# Patient Record
Sex: FEMALE | Race: WHITE | NOT HISPANIC OR LATINO
[De-identification: names, ages, dates, MRNs, and addresses within clinical notes are randomized per-mention and may not be internally consistent; named-entity substitution may affect disease eponyms.]

---

## 2017-12-27 ENCOUNTER — TRANSCRIPTION ENCOUNTER (OUTPATIENT)
Age: 37
End: 2017-12-27

## 2019-03-08 ENCOUNTER — RECORD ABSTRACTING (OUTPATIENT)
Age: 39
End: 2019-03-08

## 2019-03-08 DIAGNOSIS — K31.3 PYLOROSPASM, NOT ELSEWHERE CLASSIFIED: ICD-10-CM

## 2019-03-08 DIAGNOSIS — K29.70 GASTRITIS, UNSPECIFIED, W/OUT BLEEDING: ICD-10-CM

## 2019-03-08 DIAGNOSIS — Z82.5 FAMILY HISTORY OF ASTHMA AND OTHER CHRONIC LOWER RESPIRATORY DISEASES: ICD-10-CM

## 2019-03-08 DIAGNOSIS — Z87.42 PERSONAL HISTORY OF OTHER DISEASES OF THE FEMALE GENITAL TRACT: ICD-10-CM

## 2019-03-08 DIAGNOSIS — B96.81 HELICOBACTER PYLORI [H. PYLORI] AS THE CAUSE OF DISEASES CLASSIFIED ELSEWHERE: ICD-10-CM

## 2019-03-08 DIAGNOSIS — K64.8 OTHER HEMORRHOIDS: ICD-10-CM

## 2019-03-08 DIAGNOSIS — Z80.0 FAMILY HISTORY OF MALIGNANT NEOPLASM OF DIGESTIVE ORGANS: ICD-10-CM

## 2019-03-08 DIAGNOSIS — K58.2 MIXED IRRITABLE BOWEL SYNDROME: ICD-10-CM

## 2019-03-08 DIAGNOSIS — Z82.49 FAMILY HISTORY OF ISCHEMIC HEART DISEASE AND OTHER DISEASES OF THE CIRCULATORY SYSTEM: ICD-10-CM

## 2019-03-08 DIAGNOSIS — Z88.9 ALLERGY STATUS TO UNSPECIFIED DRUGS, MEDICAMENTS AND BIOLOGICAL SUBSTANCES: ICD-10-CM

## 2019-03-08 RX ORDER — CETIRIZINE HYDROCHLORIDE 10 MG/1
10 TABLET, FILM COATED ORAL DAILY
Refills: 0 | Status: ACTIVE | COMMUNITY

## 2019-05-22 DIAGNOSIS — K21.0 GASTRO-ESOPHAGEAL REFLUX DISEASE WITH ESOPHAGITIS: ICD-10-CM

## 2019-05-23 ENCOUNTER — APPOINTMENT (OUTPATIENT)
Dept: CARDIOLOGY | Facility: CLINIC | Age: 39
End: 2019-05-23
Payer: COMMERCIAL

## 2019-05-23 ENCOUNTER — NON-APPOINTMENT (OUTPATIENT)
Age: 39
End: 2019-05-23

## 2019-05-23 VITALS
BODY MASS INDEX: 42.88 KG/M2 | WEIGHT: 239 LBS | HEART RATE: 81 BPM | SYSTOLIC BLOOD PRESSURE: 106 MMHG | DIASTOLIC BLOOD PRESSURE: 82 MMHG | HEIGHT: 62.5 IN

## 2019-05-23 PROCEDURE — 99214 OFFICE O/P EST MOD 30 MIN: CPT | Mod: 25

## 2019-05-23 PROCEDURE — 93000 ELECTROCARDIOGRAM COMPLETE: CPT

## 2019-05-23 RX ORDER — OMEPRAZOLE MAGNESIUM 20 MG/1
20 TABLET, DELAYED RELEASE ORAL TWICE DAILY
Refills: 0 | Status: DISCONTINUED | COMMUNITY
End: 2019-05-23

## 2019-05-23 RX ORDER — LIRAGLUTIDE 6 MG/ML
18 INJECTION, SOLUTION SUBCUTANEOUS
Refills: 0 | Status: DISCONTINUED | COMMUNITY
End: 2019-05-23

## 2019-05-23 NOTE — HISTORY OF PRESENT ILLNESS
[FreeTextEntry1] : Ms stark has been followed here since 2017 for edema and water retention. She has not been hospitalized. She denies chest pain, dyspnea, palpitations or syncope. She is active in daily life but no formal exercise. Her left leg is numb from the thigh to the knee laterally.This occurred after a traumatic injury.   She gets dyspnea on exertion with inclines if she is rushing.

## 2020-02-19 ENCOUNTER — RESULT CHARGE (OUTPATIENT)
Age: 40
End: 2020-02-19

## 2020-02-20 ENCOUNTER — NON-APPOINTMENT (OUTPATIENT)
Age: 40
End: 2020-02-20

## 2020-02-20 ENCOUNTER — APPOINTMENT (OUTPATIENT)
Dept: CARDIOLOGY | Facility: CLINIC | Age: 40
End: 2020-02-20
Payer: COMMERCIAL

## 2020-02-20 VITALS
WEIGHT: 234.13 LBS | BODY MASS INDEX: 42.01 KG/M2 | HEIGHT: 62.5 IN | SYSTOLIC BLOOD PRESSURE: 110 MMHG | OXYGEN SATURATION: 98 % | HEART RATE: 82 BPM | DIASTOLIC BLOOD PRESSURE: 72 MMHG

## 2020-02-20 PROCEDURE — 99212 OFFICE O/P EST SF 10 MIN: CPT

## 2020-02-20 PROCEDURE — 93000 ELECTROCARDIOGRAM COMPLETE: CPT | Mod: NC

## 2020-02-20 NOTE — REVIEW OF SYSTEMS
[Fever] : no fever [Headache] : no headache [Feeling Fatigued] : not feeling fatigued [Chills] : no chills [Dyspnea on exertion] : not dyspnea during exertion [Shortness Of Breath] : no shortness of breath [Chest  Pressure] : no chest pressure [Chest Pain] : no chest pain [Palpitations] : no palpitations [Cough] : no cough [Skin: A Rash] : no rash: [Dizziness] : no dizziness [Confusion] : no confusion was observed

## 2020-02-20 NOTE — PHYSICAL EXAM
[General Appearance - Well Developed] : well developed [Well Groomed] : well groomed [Normal Appearance] : normal appearance [General Appearance - In No Acute Distress] : no acute distress [General Appearance - Well Nourished] : well nourished [Respiration, Rhythm And Depth] : normal respiratory rhythm and effort [Heart Rate And Rhythm] : heart rate and rhythm were normal [Auscultation Breath Sounds / Voice Sounds] : lungs were clear to auscultation bilaterally [Heart Sounds] : normal S1 and S2 [Edema] : no peripheral edema present [Cyanosis, Localized] : no localized cyanosis [Abnormal Walk] : normal gait [Abdomen Soft] : soft [Impaired Insight] : insight and judgment were intact [Oriented To Time, Place, And Person] : oriented to person, place, and time [] : no rash [Affect] : the affect was normal

## 2020-02-20 NOTE — REASON FOR VISIT
[FreeTextEntry1] : Ms. Banegas presents today for follow up visit. \par \par She reports doing well. She denies chest pain, SOB, palpitations, dizziness or syncope. She is undergoing work up for bariatric surgery.

## 2020-05-25 ENCOUNTER — TRANSCRIPTION ENCOUNTER (OUTPATIENT)
Age: 40
End: 2020-05-25

## 2020-05-28 ENCOUNTER — APPOINTMENT (OUTPATIENT)
Dept: CARDIOLOGY | Facility: CLINIC | Age: 40
End: 2020-05-28
Payer: COMMERCIAL

## 2020-05-28 PROCEDURE — 99213 OFFICE O/P EST LOW 20 MIN: CPT | Mod: 95

## 2020-05-28 NOTE — HISTORY OF PRESENT ILLNESS
[Home] : at home, [unfilled] , at the time of the visit. [Other Location: e.g. Home (Enter Location, City,State)___] : at [unfilled] [Verbal consent obtained from patient] : the patient, [unfilled] [FreeTextEntry1] : Ms stark has been followed here since 2017 for edema and water retention. She has not been hospitalized. She denies chest pain, dyspnea, palpitations or syncope. She is active in daily life but no formal exercise. Her left leg is numb from the thigh to the knee laterally.This occurred after a traumatic injury.   She gets dyspnea on exertion with inclines if she is rushing.\par Her bariatric surgery was cancelled for covid.She is on Optavia now.\par She has positive  covid antibodies, in March she had n illness that she though was just a bad period.

## 2020-10-26 ENCOUNTER — APPOINTMENT (OUTPATIENT)
Dept: HUMAN REPRODUCTION | Facility: CLINIC | Age: 40
End: 2020-10-26
Payer: COMMERCIAL

## 2020-10-26 PROCEDURE — 99204 OFFICE O/P NEW MOD 45 MIN: CPT | Mod: 95

## 2020-11-11 ENCOUNTER — APPOINTMENT (OUTPATIENT)
Dept: HUMAN REPRODUCTION | Facility: CLINIC | Age: 40
End: 2020-11-11
Payer: COMMERCIAL

## 2020-11-11 PROCEDURE — 36415 COLL VENOUS BLD VENIPUNCTURE: CPT

## 2020-11-11 PROCEDURE — 99072 ADDL SUPL MATRL&STAF TM PHE: CPT

## 2020-11-11 PROCEDURE — 99213 OFFICE O/P EST LOW 20 MIN: CPT | Mod: 25

## 2020-11-11 PROCEDURE — 76830 TRANSVAGINAL US NON-OB: CPT

## 2020-11-19 ENCOUNTER — APPOINTMENT (OUTPATIENT)
Dept: HUMAN REPRODUCTION | Facility: CLINIC | Age: 40
End: 2020-11-19
Payer: COMMERCIAL

## 2020-11-19 PROCEDURE — 99213 OFFICE O/P EST LOW 20 MIN: CPT | Mod: 95

## 2020-12-17 ENCOUNTER — APPOINTMENT (OUTPATIENT)
Dept: HUMAN REPRODUCTION | Facility: CLINIC | Age: 40
End: 2020-12-17
Payer: COMMERCIAL

## 2020-12-17 PROCEDURE — 99215 OFFICE O/P EST HI 40 MIN: CPT | Mod: 95

## 2021-01-15 ENCOUNTER — APPOINTMENT (OUTPATIENT)
Dept: HUMAN REPRODUCTION | Facility: CLINIC | Age: 41
End: 2021-01-15
Payer: COMMERCIAL

## 2021-01-15 PROCEDURE — 99213 OFFICE O/P EST LOW 20 MIN: CPT | Mod: 25

## 2021-01-15 PROCEDURE — 36415 COLL VENOUS BLD VENIPUNCTURE: CPT

## 2021-01-15 PROCEDURE — 99072 ADDL SUPL MATRL&STAF TM PHE: CPT

## 2021-01-15 PROCEDURE — 76830 TRANSVAGINAL US NON-OB: CPT

## 2021-01-21 ENCOUNTER — APPOINTMENT (OUTPATIENT)
Dept: OBGYN | Facility: CLINIC | Age: 41
End: 2021-01-21
Payer: COMMERCIAL

## 2021-01-21 VITALS
WEIGHT: 220 LBS | DIASTOLIC BLOOD PRESSURE: 80 MMHG | BODY MASS INDEX: 39.47 KG/M2 | SYSTOLIC BLOOD PRESSURE: 110 MMHG | HEIGHT: 62.5 IN

## 2021-01-21 DIAGNOSIS — Z01.419 ENCOUNTER FOR GYNECOLOGICAL EXAMINATION (GENERAL) (ROUTINE) W/OUT ABNORMAL FINDINGS: ICD-10-CM

## 2021-01-21 PROCEDURE — 99385 PREV VISIT NEW AGE 18-39: CPT

## 2021-01-21 PROCEDURE — 99072 ADDL SUPL MATRL&STAF TM PHE: CPT

## 2021-01-23 LAB
BACTERIA UR CULT: NORMAL
HPV HIGH+LOW RISK DNA PNL CVX: NOT DETECTED

## 2021-01-27 LAB
CANDIDA VAG CYTO: NOT DETECTED
CYTOLOGY CVX/VAG DOC THIN PREP: NORMAL
G VAGINALIS+PREV SP MTYP VAG QL MICRO: DETECTED
T VAGINALIS VAG QL WET PREP: NOT DETECTED

## 2021-02-12 ENCOUNTER — APPOINTMENT (OUTPATIENT)
Dept: HUMAN REPRODUCTION | Facility: CLINIC | Age: 41
End: 2021-02-12

## 2021-02-19 ENCOUNTER — APPOINTMENT (OUTPATIENT)
Dept: HUMAN REPRODUCTION | Facility: CLINIC | Age: 41
End: 2021-02-19
Payer: COMMERCIAL

## 2021-02-19 PROCEDURE — 58340 CATHETER FOR HYSTEROGRAPHY: CPT

## 2021-02-19 PROCEDURE — 99072 ADDL SUPL MATRL&STAF TM PHE: CPT

## 2021-02-19 PROCEDURE — 99213 OFFICE O/P EST LOW 20 MIN: CPT | Mod: 25

## 2021-02-19 PROCEDURE — 76831 ECHO EXAM UTERUS: CPT

## 2021-03-01 ENCOUNTER — APPOINTMENT (OUTPATIENT)
Dept: HUMAN REPRODUCTION | Facility: AMBULATORY SURGERY CENTER | Age: 41
End: 2021-03-01
Payer: COMMERCIAL

## 2021-03-01 PROCEDURE — 58561 HYSTEROSCOPY REMOVE MYOMA: CPT

## 2021-03-22 ENCOUNTER — APPOINTMENT (OUTPATIENT)
Dept: HUMAN REPRODUCTION | Facility: CLINIC | Age: 41
End: 2021-03-22
Payer: COMMERCIAL

## 2021-03-22 PROCEDURE — 99072 ADDL SUPL MATRL&STAF TM PHE: CPT

## 2021-03-22 PROCEDURE — 36415 COLL VENOUS BLD VENIPUNCTURE: CPT

## 2021-03-22 PROCEDURE — 76830 TRANSVAGINAL US NON-OB: CPT

## 2021-03-22 PROCEDURE — 99213 OFFICE O/P EST LOW 20 MIN: CPT | Mod: 25

## 2021-03-29 ENCOUNTER — APPOINTMENT (OUTPATIENT)
Dept: HUMAN REPRODUCTION | Facility: CLINIC | Age: 41
End: 2021-03-29
Payer: COMMERCIAL

## 2021-03-29 PROCEDURE — 58340 CATHETER FOR HYSTEROGRAPHY: CPT

## 2021-03-29 PROCEDURE — 99213 OFFICE O/P EST LOW 20 MIN: CPT | Mod: 25

## 2021-03-29 PROCEDURE — 76831 ECHO EXAM UTERUS: CPT

## 2021-03-29 PROCEDURE — 99072 ADDL SUPL MATRL&STAF TM PHE: CPT

## 2021-05-03 ENCOUNTER — APPOINTMENT (OUTPATIENT)
Dept: HUMAN REPRODUCTION | Facility: CLINIC | Age: 41
End: 2021-05-03
Payer: COMMERCIAL

## 2021-05-03 PROCEDURE — 99213 OFFICE O/P EST LOW 20 MIN: CPT

## 2021-05-03 PROCEDURE — 99072 ADDL SUPL MATRL&STAF TM PHE: CPT

## 2021-05-10 ENCOUNTER — APPOINTMENT (OUTPATIENT)
Dept: HUMAN REPRODUCTION | Facility: AMBULATORY SURGERY CENTER | Age: 41
End: 2021-05-10
Payer: COMMERCIAL

## 2021-05-10 PROCEDURE — 58561 HYSTEROSCOPY REMOVE MYOMA: CPT

## 2021-05-20 ENCOUNTER — APPOINTMENT (OUTPATIENT)
Dept: HUMAN REPRODUCTION | Facility: CLINIC | Age: 41
End: 2021-05-20
Payer: COMMERCIAL

## 2021-05-20 PROCEDURE — 99213 OFFICE O/P EST LOW 20 MIN: CPT | Mod: 95

## 2021-06-03 ENCOUNTER — APPOINTMENT (OUTPATIENT)
Dept: CARDIOLOGY | Facility: CLINIC | Age: 41
End: 2021-06-03
Payer: COMMERCIAL

## 2021-06-03 ENCOUNTER — NON-APPOINTMENT (OUTPATIENT)
Age: 41
End: 2021-06-03

## 2021-06-03 VITALS
DIASTOLIC BLOOD PRESSURE: 80 MMHG | HEIGHT: 62.5 IN | SYSTOLIC BLOOD PRESSURE: 115 MMHG | TEMPERATURE: 97.8 F | WEIGHT: 205 LBS | BODY MASS INDEX: 36.78 KG/M2 | HEART RATE: 75 BPM

## 2021-06-03 DIAGNOSIS — Z00.00 ENCOUNTER FOR GENERAL ADULT MEDICAL EXAMINATION W/OUT ABNORMAL FINDINGS: ICD-10-CM

## 2021-06-03 DIAGNOSIS — Z82.49 FAMILY HISTORY OF ISCHEMIC HEART DISEASE AND OTHER DISEASES OF THE CIRCULATORY SYSTEM: ICD-10-CM

## 2021-06-03 PROCEDURE — 36415 COLL VENOUS BLD VENIPUNCTURE: CPT

## 2021-06-03 PROCEDURE — 99072 ADDL SUPL MATRL&STAF TM PHE: CPT

## 2021-06-03 PROCEDURE — 93000 ELECTROCARDIOGRAM COMPLETE: CPT

## 2021-06-03 PROCEDURE — 99214 OFFICE O/P EST MOD 30 MIN: CPT

## 2021-06-03 RX ORDER — METRONIDAZOLE 7.5 MG/G
0.75 GEL VAGINAL
Qty: 1 | Refills: 0 | Status: DISCONTINUED | COMMUNITY
Start: 2021-01-27 | End: 2021-06-03

## 2021-06-03 NOTE — CARDIOLOGY SUMMARY
[___] : [unfilled] [LVEF ___%] : LVEF [unfilled]% [de-identified] : 6/3/2021- bonilla cheng [de-identified] : 2017- ef 65%

## 2021-06-04 LAB
ANION GAP SERPL CALC-SCNC: 10 MMOL/L
BUN SERPL-MCNC: 16 MG/DL
CALCIUM SERPL-MCNC: 9.1 MG/DL
CHLORIDE SERPL-SCNC: 104 MMOL/L
CHOLEST SERPL-MCNC: 166 MG/DL
CO2 SERPL-SCNC: 25 MMOL/L
CREAT SERPL-MCNC: 0.78 MG/DL
GLUCOSE SERPL-MCNC: 112 MG/DL
HDLC SERPL-MCNC: 48 MG/DL
LDLC SERPL CALC-MCNC: 87 MG/DL
NONHDLC SERPL-MCNC: 117 MG/DL
POTASSIUM SERPL-SCNC: 4.5 MMOL/L
SODIUM SERPL-SCNC: 138 MMOL/L
TRIGL SERPL-MCNC: 150 MG/DL

## 2021-06-11 ENCOUNTER — APPOINTMENT (OUTPATIENT)
Dept: HUMAN REPRODUCTION | Facility: CLINIC | Age: 41
End: 2021-06-11
Payer: COMMERCIAL

## 2021-06-11 PROCEDURE — 76831 ECHO EXAM UTERUS: CPT

## 2021-06-11 PROCEDURE — 99072 ADDL SUPL MATRL&STAF TM PHE: CPT

## 2021-06-11 PROCEDURE — 99213 OFFICE O/P EST LOW 20 MIN: CPT | Mod: 25

## 2021-06-11 PROCEDURE — 58340 CATHETER FOR HYSTEROGRAPHY: CPT

## 2021-10-13 ENCOUNTER — RESULT REVIEW (OUTPATIENT)
Age: 41
End: 2021-10-13

## 2022-02-18 ENCOUNTER — APPOINTMENT (OUTPATIENT)
Dept: HUMAN REPRODUCTION | Facility: CLINIC | Age: 42
End: 2022-02-18
Payer: COMMERCIAL

## 2022-02-18 PROCEDURE — 36415 COLL VENOUS BLD VENIPUNCTURE: CPT

## 2022-02-18 PROCEDURE — 76830 TRANSVAGINAL US NON-OB: CPT

## 2022-02-18 PROCEDURE — 99213 OFFICE O/P EST LOW 20 MIN: CPT | Mod: 25

## 2022-02-24 ENCOUNTER — APPOINTMENT (OUTPATIENT)
Dept: HUMAN REPRODUCTION | Facility: CLINIC | Age: 42
End: 2022-02-24
Payer: COMMERCIAL

## 2022-02-24 PROCEDURE — 36415 COLL VENOUS BLD VENIPUNCTURE: CPT

## 2022-02-24 PROCEDURE — 76830 TRANSVAGINAL US NON-OB: CPT

## 2022-02-28 ENCOUNTER — APPOINTMENT (OUTPATIENT)
Dept: HUMAN REPRODUCTION | Facility: CLINIC | Age: 42
End: 2022-02-28
Payer: COMMERCIAL

## 2022-02-28 PROCEDURE — 76856 US EXAM PELVIC COMPLETE: CPT

## 2022-02-28 PROCEDURE — 99213 OFFICE O/P EST LOW 20 MIN: CPT | Mod: 25

## 2022-02-28 PROCEDURE — 36415 COLL VENOUS BLD VENIPUNCTURE: CPT

## 2022-03-03 ENCOUNTER — APPOINTMENT (OUTPATIENT)
Dept: HUMAN REPRODUCTION | Facility: CLINIC | Age: 42
End: 2022-03-03
Payer: COMMERCIAL

## 2022-03-03 PROCEDURE — 36415 COLL VENOUS BLD VENIPUNCTURE: CPT

## 2022-03-03 PROCEDURE — 76856 US EXAM PELVIC COMPLETE: CPT

## 2022-03-25 ENCOUNTER — APPOINTMENT (OUTPATIENT)
Dept: HUMAN REPRODUCTION | Facility: CLINIC | Age: 42
End: 2022-03-25
Payer: COMMERCIAL

## 2022-03-25 PROCEDURE — 99213 OFFICE O/P EST LOW 20 MIN: CPT | Mod: 25

## 2022-03-25 PROCEDURE — 36415 COLL VENOUS BLD VENIPUNCTURE: CPT

## 2022-03-25 PROCEDURE — 76830 TRANSVAGINAL US NON-OB: CPT

## 2022-04-01 ENCOUNTER — APPOINTMENT (OUTPATIENT)
Dept: HUMAN REPRODUCTION | Facility: CLINIC | Age: 42
End: 2022-04-01
Payer: COMMERCIAL

## 2022-04-01 PROCEDURE — 76830 TRANSVAGINAL US NON-OB: CPT

## 2022-04-01 PROCEDURE — 99213 OFFICE O/P EST LOW 20 MIN: CPT | Mod: 25

## 2022-04-01 PROCEDURE — 36415 COLL VENOUS BLD VENIPUNCTURE: CPT

## 2022-04-08 ENCOUNTER — APPOINTMENT (OUTPATIENT)
Dept: HUMAN REPRODUCTION | Facility: CLINIC | Age: 42
End: 2022-04-08
Payer: COMMERCIAL

## 2022-04-08 PROCEDURE — 76830 TRANSVAGINAL US NON-OB: CPT

## 2022-04-08 PROCEDURE — 99213 OFFICE O/P EST LOW 20 MIN: CPT | Mod: 25

## 2022-04-08 PROCEDURE — 36415 COLL VENOUS BLD VENIPUNCTURE: CPT

## 2022-04-13 ENCOUNTER — APPOINTMENT (OUTPATIENT)
Dept: HUMAN REPRODUCTION | Facility: CLINIC | Age: 42
End: 2022-04-13
Payer: COMMERCIAL

## 2022-04-13 PROCEDURE — 76830 TRANSVAGINAL US NON-OB: CPT

## 2022-04-13 PROCEDURE — 36415 COLL VENOUS BLD VENIPUNCTURE: CPT

## 2022-04-28 ENCOUNTER — APPOINTMENT (OUTPATIENT)
Dept: HUMAN REPRODUCTION | Facility: CLINIC | Age: 42
End: 2022-04-28
Payer: COMMERCIAL

## 2022-04-28 PROCEDURE — 36415 COLL VENOUS BLD VENIPUNCTURE: CPT

## 2022-05-09 ENCOUNTER — APPOINTMENT (OUTPATIENT)
Dept: HUMAN REPRODUCTION | Facility: CLINIC | Age: 42
End: 2022-05-09
Payer: COMMERCIAL

## 2022-05-09 PROCEDURE — 76830 TRANSVAGINAL US NON-OB: CPT

## 2022-05-09 PROCEDURE — 36415 COLL VENOUS BLD VENIPUNCTURE: CPT

## 2022-05-16 ENCOUNTER — APPOINTMENT (OUTPATIENT)
Dept: HUMAN REPRODUCTION | Facility: CLINIC | Age: 42
End: 2022-05-16
Payer: COMMERCIAL

## 2022-05-16 PROCEDURE — 99213 OFFICE O/P EST LOW 20 MIN: CPT | Mod: 25

## 2022-05-16 PROCEDURE — 76830 TRANSVAGINAL US NON-OB: CPT

## 2022-05-16 PROCEDURE — 36415 COLL VENOUS BLD VENIPUNCTURE: CPT

## 2022-05-19 ENCOUNTER — APPOINTMENT (OUTPATIENT)
Dept: HUMAN REPRODUCTION | Facility: CLINIC | Age: 42
End: 2022-05-19
Payer: COMMERCIAL

## 2022-05-19 PROCEDURE — 76830 TRANSVAGINAL US NON-OB: CPT

## 2022-05-19 PROCEDURE — 36415 COLL VENOUS BLD VENIPUNCTURE: CPT

## 2022-05-23 ENCOUNTER — APPOINTMENT (OUTPATIENT)
Dept: HUMAN REPRODUCTION | Facility: CLINIC | Age: 42
End: 2022-05-23
Payer: COMMERCIAL

## 2022-05-23 PROCEDURE — 76830 TRANSVAGINAL US NON-OB: CPT

## 2022-05-23 PROCEDURE — 36415 COLL VENOUS BLD VENIPUNCTURE: CPT

## 2022-05-23 PROCEDURE — 99213 OFFICE O/P EST LOW 20 MIN: CPT | Mod: 25

## 2022-05-25 ENCOUNTER — APPOINTMENT (OUTPATIENT)
Dept: HUMAN REPRODUCTION | Facility: CLINIC | Age: 42
End: 2022-05-25
Payer: COMMERCIAL

## 2022-05-25 PROCEDURE — 36415 COLL VENOUS BLD VENIPUNCTURE: CPT

## 2022-05-31 ENCOUNTER — APPOINTMENT (OUTPATIENT)
Dept: HUMAN REPRODUCTION | Facility: CLINIC | Age: 42
End: 2022-05-31
Payer: COMMERCIAL

## 2022-05-31 PROCEDURE — 36415 COLL VENOUS BLD VENIPUNCTURE: CPT

## 2022-06-05 ENCOUNTER — NON-APPOINTMENT (OUTPATIENT)
Age: 42
End: 2022-06-05

## 2022-06-06 ENCOUNTER — NON-APPOINTMENT (OUTPATIENT)
Age: 42
End: 2022-06-06

## 2022-06-06 ENCOUNTER — APPOINTMENT (OUTPATIENT)
Dept: CARDIOLOGY | Facility: CLINIC | Age: 42
End: 2022-06-06
Payer: COMMERCIAL

## 2022-06-06 VITALS
WEIGHT: 242 LBS | DIASTOLIC BLOOD PRESSURE: 80 MMHG | HEIGHT: 62 IN | BODY MASS INDEX: 44.53 KG/M2 | SYSTOLIC BLOOD PRESSURE: 117 MMHG

## 2022-06-06 PROCEDURE — 93000 ELECTROCARDIOGRAM COMPLETE: CPT

## 2022-06-06 PROCEDURE — 99214 OFFICE O/P EST MOD 30 MIN: CPT

## 2022-06-06 RX ORDER — OMEPRAZOLE 20 MG/1
20 CAPSULE, DELAYED RELEASE ORAL
Refills: 0 | Status: DISCONTINUED | COMMUNITY
End: 2022-06-06

## 2022-06-06 NOTE — HISTORY OF PRESENT ILLNESS
[FreeTextEntry1] : Ms stark has been followed here since 2017 for edema and water retention.\par  Since last visit She has not been hospitalized. She had positive covid antibodies ( was ill march 2020).\par \par She There have been no hospitalizations since last visit.\par \par \par She has been working with Dr Julissa Monson for infertility, had a hysteroscopy and was treated with hormones. \par \par She denies chest pain, she has dyspnea on exertion, palpitations or syncope. She gets a rare sensation in her left chest like " a tingle".\par \par She is active in daily life but no formal exercise.  \par

## 2022-06-07 ENCOUNTER — APPOINTMENT (OUTPATIENT)
Dept: ENDOCRINOLOGY | Facility: CLINIC | Age: 42
End: 2022-06-07
Payer: COMMERCIAL

## 2022-06-07 DIAGNOSIS — Z83.3 FAMILY HISTORY OF DIABETES MELLITUS: ICD-10-CM

## 2022-06-07 DIAGNOSIS — Z83.49 FAMILY HISTORY OF OTHER ENDOCRINE, NUTRITIONAL AND METABOLIC DISEASES: ICD-10-CM

## 2022-06-07 DIAGNOSIS — Z82.49 FAMILY HISTORY OF ISCHEMIC HEART DISEASE AND OTHER DISEASES OF THE CIRCULATORY SYSTEM: ICD-10-CM

## 2022-06-07 PROCEDURE — 99205 OFFICE O/P NEW HI 60 MIN: CPT | Mod: 25,95

## 2022-06-07 PROCEDURE — G0447 BEHAVIOR COUNSEL OBESITY 15M: CPT | Mod: 95

## 2022-06-07 RX ORDER — ADHESIVE TAPE 3"X 2.3 YD
50 MCG TAPE, NON-MEDICATED TOPICAL
Refills: 0 | Status: ACTIVE | COMMUNITY

## 2022-06-07 NOTE — REVIEW OF SYSTEMS
[Recent Weight Gain (___ Lbs)] : recent weight gain: [unfilled] lbs [Lower Ext Edema] : lower extremity edema [Constipation] : constipation [Irregular Menses] : irregular menses [Pain/Numbness of Digits] : pain/numbness of digits [FreeTextEntry7] : some times depends on eating  [FreeTextEntry8] : gets it monthly  [de-identified] : loss of body hair

## 2022-06-07 NOTE — HISTORY OF PRESENT ILLNESS
[Home] : at home, [unfilled] , at the time of the visit. [Medical Office: ([facility])___] : at the medical office located in  [Verbal consent obtained from patient] : the patient, [unfilled] [FreeTextEntry1] : Ms. SOUMYA HICKS is a 41 year old female sent in a consult by Dr Rodriguez for  h/o prolactinoma was on Parlodel for many year , with breast discharge at diagnosis , months with no periods \par Dr Julissa Adhikari is her fertility doctor , trying to conceive \par \par was told she has PCOS, irregular periods\par pituiatry adenoma 14yo ,  was initially on medication, then was stopped then 2yrs later growing in density per pt, was advised surgery , Dr [facility], removed 2004, when she was 25yo, small left over per pt, last scan 12 yrs ago with Dr Hutchinson , none since then\par \par retaining water, gained 40 lb  since end of last year\par \par was told she has only left ovary working , her  was checked and all good with him per pt \par was told she has low ovarian reserve by Dr Adhikari\par 2020 labs Dr Adhikari \par \par has Mediterranean anemia \par A1c 5.6\par ferritin 13.4\par \par started period at 9yo \par \par OCP since 14yo up to 32yo \par \par \par was told prediabetes

## 2022-06-07 NOTE — CONSULT LETTER
[Dear  ___] : Dear  [unfilled], [Consult Letter:] : I had the pleasure of evaluating your patient, [unfilled]. [Please see my note below.] : Please see my note below. [Sincerely,] : Sincerely, [FreeTextEntry3] : Sincerely,\par \par RIYA BRADFORD MD\par [facility]\par [facility]\par

## 2022-06-17 ENCOUNTER — LABORATORY RESULT (OUTPATIENT)
Age: 42
End: 2022-06-17

## 2022-06-22 ENCOUNTER — APPOINTMENT (OUTPATIENT)
Dept: ENDOCRINOLOGY | Facility: CLINIC | Age: 42
End: 2022-06-22

## 2022-06-22 VITALS — BODY MASS INDEX: 44.53 KG/M2 | HEIGHT: 62 IN | WEIGHT: 242 LBS

## 2022-06-22 LAB
17OHP SERPL-MCNC: 18 NG/DL
ACTH SER-ACNC: 20.8 PG/ML
ALBUMIN SERPL ELPH-MCNC: 4.4 G/DL
ALP BLD-CCNC: 100 U/L
ALPHA SUBUNIT SERPL-MCNC: 0.22 NG/ML
ALT SERPL-CCNC: 37 U/L
ANDROST SERPL-MCNC: 80 NG/DL
ANION GAP SERPL CALC-SCNC: 13 MMOL/L
AST SERPL-CCNC: 20 U/L
BASOPHILS # BLD AUTO: 0.03 K/UL
BASOPHILS NFR BLD AUTO: 0.5 %
BILIRUB SERPL-MCNC: 0.4 MG/DL
BUN SERPL-MCNC: 10 MG/DL
CALCIUM SERPL-MCNC: 9.4 MG/DL
CHLORIDE SERPL-SCNC: 103 MMOL/L
CHOLEST SERPL-MCNC: 173 MG/DL
CO2 SERPL-SCNC: 24 MMOL/L
CORTIS SERPL-MCNC: 12 UG/DL
CORTIS SERPL-MCNC: 12.1 UG/DL
CREAT SERPL-MCNC: 0.68 MG/DL
DEXAMETHASONE LEVEL: <20 NG/DL
DHEA-S SERPL-MCNC: 274 UG/DL
EGFR: 113 ML/MIN/1.73M2
ENDOMYSIUM IGA SER QL: NEGATIVE
ENDOMYSIUM IGA TITR SER: NORMAL
EOSINOPHIL # BLD AUTO: 0.03 K/UL
EOSINOPHIL NFR BLD AUTO: 0.5 %
ESTIMATED AVERAGE GLUCOSE: 114 MG/DL
ESTRADIOL SERPL-MCNC: 20 PG/ML
FERRITIN SERPL-MCNC: 33 NG/ML
FSH SERPL-MCNC: 11.8 IU/L
GH SERPL-MCNC: 0.28 NG/ML
GLIADIN IGA SER QL: <5 UNITS
GLIADIN IGG SER QL: <5 UNITS
GLIADIN PEPTIDE IGA SER-ACNC: NEGATIVE
GLIADIN PEPTIDE IGG SER-ACNC: NEGATIVE
GLUCOSE 1H P 100 G GLC PO SERPL-MCNC: 223 MG/DL
GLUCOSE 2H P CHAL SERPL-MCNC: 156 MG/DL
GLUCOSE 3H P CHAL SERPL-MCNC: 72 MG/DL
GLUCOSE BS SERPL-MCNC: 96 MG/DL
GLUCOSE SERPL-MCNC: 106 MG/DL
HBA1C MFR BLD HPLC: 5.6 %
HCT VFR BLD CALC: 42.2 %
HDLC SERPL-MCNC: 43 MG/DL
HGB BLD-MCNC: 13.7 G/DL
IGA SER QL IEP: 187 MG/DL
IGF-1 INTERP: NORMAL
IGF-I BLD-MCNC: 116 NG/ML
IMM GRANULOCYTES NFR BLD AUTO: 0.5 %
INSULIN 1H P CHAL SERPL-SCNC: 199 UU/ML
INSULIN 2H P CHAL SERPL-ACNC: 435 UU/ML
INSULIN P FAST SERPL-ACNC: 22.4 UU/ML
INSULIN SERPL-MCNC: 112 UU/ML
IRON SATN MFR SERPL: 27 %
IRON SERPL-MCNC: 85 UG/DL
LDLC SERPL CALC-MCNC: 100 MG/DL
LH SERPL-ACNC: 7.6 IU/L
LYMPHOCYTES # BLD AUTO: 1.84 K/UL
LYMPHOCYTES NFR BLD AUTO: 30.5 %
MAN DIFF?: NORMAL
MCHC RBC-ENTMCNC: 29.3 PG
MCHC RBC-ENTMCNC: 32.5 GM/DL
MCV RBC AUTO: 90.4 FL
MONOCYTES # BLD AUTO: 0.3 K/UL
MONOCYTES NFR BLD AUTO: 5 %
MONOMERIC PROLACTIN (ICMA)*: 16.1 NG/ML
NEUTROPHILS # BLD AUTO: 3.8 K/UL
NEUTROPHILS NFR BLD AUTO: 63 %
NONHDLC SERPL-MCNC: 130 MG/DL
OSMOLALITY SERPL: 291 MOSMOL/KG
OVARY AB SER QL IF: NEGATIVE
PERCENT MACROPROLACTIN: 16 %
PLATELET # BLD AUTO: 353 K/UL
POTASSIUM SERPL-SCNC: 4.3 MMOL/L
PROLACTIN SERPL-MCNC: 18.8 NG/ML
PROLACTIN SERPL-MCNC: 18.8 NG/ML
PROLACTIN SERPL-MCNC: 20.1 NG/ML
PROLACTIN, SERUM (ICMA)*: 19.2 NG/ML
PROT SERPL-MCNC: 6.7 G/DL
RBC # BLD: 4.67 M/UL
RBC # FLD: 13 %
SODIUM SERPL-SCNC: 141 MMOL/L
T4 FREE SERPL-MCNC: 1 NG/DL
TESTOST SERPL-MCNC: 26.5 NG/DL
TIBC SERPL-MCNC: 320 UG/DL
TRIGL SERPL-MCNC: 149 MG/DL
TSH SERPL-ACNC: 2.22 UIU/ML
TTG IGA SER IA-ACNC: 2.2 U/ML
TTG IGA SER-ACNC: NEGATIVE
TTG IGG SER IA-ACNC: <1.2 U/ML
TTG IGG SER IA-ACNC: NEGATIVE
UIBC SERPL-MCNC: 236 UG/DL
WBC # FLD AUTO: 6.03 K/UL

## 2022-06-22 PROCEDURE — 99215 OFFICE O/P EST HI 40 MIN: CPT | Mod: 95

## 2022-06-22 PROCEDURE — G0447 BEHAVIOR COUNSEL OBESITY 15M: CPT | Mod: 59,95

## 2022-06-22 RX ORDER — DEXAMETHASONE 1 MG/1
1 TABLET ORAL
Qty: 1 | Refills: 0 | Status: DISCONTINUED | COMMUNITY
Start: 2022-06-07 | End: 2022-06-22

## 2022-06-22 NOTE — HISTORY OF PRESENT ILLNESS
[Home] : at home, [unfilled] , at the time of the visit. [Medical Office: ([facility])___] : at the medical office located in  [Verbal consent obtained from patient] : the patient, [unfilled] [FreeTextEntry1] : Ms. SOUMYA HICKS is a 42 year old female initially sent in a consult by Dr Rodriguez for  h/o prolactinoma was on Parlodel for many year , with breast discharge at diagnosis , months with no periods \par Dr Julissa Monson is her fertility doctor , trying to conceive , planning IUI per pt \par \par was told she has PCOS, with  irregular periods by prior Endo \par pituiatry adenoma 16yo ,  was initially on medication, then was stopped then 2yrs later growing in density per pt, was advised surgery , Dr Hutchinson Neuro[facility], removed 2004, when she was 25yo, small left over per pt, last scan 12 yrs ago with Dr Hutchinson , none since then\par \par retaining water, gained 40 lb  since end of last year\par \par was told she has only left ovary working , her  was checked and all good with him per pt \par was told she has low ovarian reserve by Dr Monson, has low normal estradiol with our labs third day of her period \par 2020 labs Dr Monson estradiol 37, with us 20\par \par has Mediterranean anemia \par A1c 5.6\par ferritin 13.4\par \par started period at 11yo \par \par OCP since 16yo up to 30yo \par \par \par was told prediabetes

## 2022-06-22 NOTE — REASON FOR VISIT
[Follow - Up] : a follow-up visit [Weight Management/Obesity] : weight management/obesity [PCOS] : PCOS [Other___] : [unfilled]

## 2022-06-22 NOTE — REVIEW OF SYSTEMS
[Recent Weight Gain (___ Lbs)] : recent weight gain: [unfilled] lbs [Lower Ext Edema] : lower extremity edema [Constipation] : constipation [Irregular Menses] : irregular menses [Pain/Numbness of Digits] : pain/numbness of digits [FreeTextEntry7] : some times depends on eating  [FreeTextEntry8] : gets it monthly  [de-identified] : loss of body hair

## 2022-07-05 ENCOUNTER — NON-APPOINTMENT (OUTPATIENT)
Age: 42
End: 2022-07-05

## 2022-07-15 ENCOUNTER — APPOINTMENT (OUTPATIENT)
Dept: HUMAN REPRODUCTION | Facility: CLINIC | Age: 42
End: 2022-07-15

## 2022-07-15 PROCEDURE — 36415 COLL VENOUS BLD VENIPUNCTURE: CPT

## 2022-07-15 PROCEDURE — 76830 TRANSVAGINAL US NON-OB: CPT

## 2022-07-15 PROCEDURE — 99213 OFFICE O/P EST LOW 20 MIN: CPT | Mod: 25

## 2022-07-25 ENCOUNTER — APPOINTMENT (OUTPATIENT)
Dept: HUMAN REPRODUCTION | Facility: CLINIC | Age: 42
End: 2022-07-25

## 2022-07-25 PROCEDURE — 36415 COLL VENOUS BLD VENIPUNCTURE: CPT

## 2022-07-25 PROCEDURE — 99213 OFFICE O/P EST LOW 20 MIN: CPT | Mod: 25

## 2022-07-25 PROCEDURE — 76830 TRANSVAGINAL US NON-OB: CPT

## 2022-07-26 ENCOUNTER — RESULT REVIEW (OUTPATIENT)
Age: 42
End: 2022-07-26

## 2022-07-27 ENCOUNTER — TRANSCRIPTION ENCOUNTER (OUTPATIENT)
Age: 42
End: 2022-07-27

## 2022-07-27 ENCOUNTER — APPOINTMENT (OUTPATIENT)
Dept: CARDIOLOGY | Facility: CLINIC | Age: 42
End: 2022-07-27

## 2022-07-29 ENCOUNTER — NON-APPOINTMENT (OUTPATIENT)
Age: 42
End: 2022-07-29

## 2022-09-26 ENCOUNTER — APPOINTMENT (OUTPATIENT)
Dept: HUMAN REPRODUCTION | Facility: CLINIC | Age: 42
End: 2022-09-26

## 2022-09-26 PROCEDURE — 76830 TRANSVAGINAL US NON-OB: CPT

## 2022-09-26 PROCEDURE — 36415 COLL VENOUS BLD VENIPUNCTURE: CPT

## 2022-09-30 ENCOUNTER — APPOINTMENT (OUTPATIENT)
Dept: HUMAN REPRODUCTION | Facility: CLINIC | Age: 42
End: 2022-09-30

## 2022-09-30 PROCEDURE — 76830 TRANSVAGINAL US NON-OB: CPT

## 2022-09-30 PROCEDURE — 36415 COLL VENOUS BLD VENIPUNCTURE: CPT

## 2022-09-30 PROCEDURE — 99213 OFFICE O/P EST LOW 20 MIN: CPT | Mod: 25

## 2022-10-03 ENCOUNTER — APPOINTMENT (OUTPATIENT)
Dept: HUMAN REPRODUCTION | Facility: CLINIC | Age: 42
End: 2022-10-03

## 2022-10-03 PROCEDURE — 76830 TRANSVAGINAL US NON-OB: CPT

## 2022-10-03 PROCEDURE — 99213 OFFICE O/P EST LOW 20 MIN: CPT | Mod: 25

## 2022-10-03 PROCEDURE — 36415 COLL VENOUS BLD VENIPUNCTURE: CPT

## 2022-10-05 ENCOUNTER — APPOINTMENT (OUTPATIENT)
Dept: HUMAN REPRODUCTION | Facility: CLINIC | Age: 42
End: 2022-10-05

## 2022-10-05 PROCEDURE — 58322 ARTIFICIAL INSEMINATION: CPT

## 2022-10-05 PROCEDURE — 89261 SPERM ISOLATION COMPLEX: CPT

## 2022-10-05 PROCEDURE — 99213 OFFICE O/P EST LOW 20 MIN: CPT | Mod: 25

## 2022-10-10 ENCOUNTER — APPOINTMENT (OUTPATIENT)
Dept: HUMAN REPRODUCTION | Facility: CLINIC | Age: 42
End: 2022-10-10

## 2022-10-17 NOTE — HISTORY OF PRESENT ILLNESS
Spoke to patient and discussed the patch testing process. Application scheduled  at 12pm. Reading scheduled 11/15 at 4pm with Dr. Mayfield. Patient is aware both appts will be at the [place] office.     Patch Testing Questionnaire        1. Approximately when did the rash start?  2 years    2. Main areas of body affected by rash:    Hands     3. How severe is your rash at its worst? Moderate     4. Have you had patch testing in the past?   []Yes     [x]No  If Yes, details:      5. Do you have a history of the following?    Eczema      6. 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 Do you think your rash is triggered by your occupation?  []Yes    [x]No    8. Do you have anything implanted in your body?  (eg, dental implants, metal screws/clips, joint replacement, pacemaker)    []Yes    [x]No    9. Are there specific things you are exposed to that you know trigger the rash?  What are they? none      Please complete this form and fax it to [phone]  You may contact Dr. Mayfield's office with questions regarding patch [phone]   [FreeTextEntry1] : Ms stark has been followed here since 2017 for edema and water retention.\par  Since last visit She has not been hospitalized. She had positive covid antibodies ( was ill march 2020).\par \par She has been working with Dr Julissa Monson for infertility, had a hysteroscopy and was treated with hormones. \par \par  She denies chest pain, dyspnea, palpitations or syncope. \par She is active in daily life but no formal exercise.  \par She is getting  in November.

## 2022-10-19 ENCOUNTER — APPOINTMENT (OUTPATIENT)
Dept: HUMAN REPRODUCTION | Facility: CLINIC | Age: 42
End: 2022-10-19

## 2022-10-19 PROCEDURE — 36415 COLL VENOUS BLD VENIPUNCTURE: CPT

## 2022-10-25 ENCOUNTER — APPOINTMENT (OUTPATIENT)
Dept: ENDOCRINOLOGY | Facility: CLINIC | Age: 42
End: 2022-10-25

## 2022-10-25 VITALS
HEIGHT: 62 IN | OXYGEN SATURATION: 99 % | DIASTOLIC BLOOD PRESSURE: 82 MMHG | WEIGHT: 243 LBS | SYSTOLIC BLOOD PRESSURE: 122 MMHG | HEART RATE: 107 BPM | BODY MASS INDEX: 44.72 KG/M2

## 2022-10-25 PROCEDURE — G0447 BEHAVIOR COUNSEL OBESITY 15M: CPT

## 2022-10-25 PROCEDURE — 99215 OFFICE O/P EST HI 40 MIN: CPT | Mod: 25

## 2022-10-25 NOTE — REVIEW OF SYSTEMS
[Recent Weight Gain (___ Lbs)] : recent weight gain: [unfilled] lbs [Lower Ext Edema] : lower extremity edema [Constipation] : constipation [Irregular Menses] : irregular menses [Pain/Numbness of Digits] : pain/numbness of digits [FreeTextEntry6] : Her  complaining of her snoring at night has also sinus problems per patient never had a sleep study [FreeTextEntry7] : some times depends on eating  [FreeTextEntry8] : gets it monthly  [de-identified] : loss of body hair

## 2022-10-25 NOTE — HISTORY OF PRESENT ILLNESS
[Home] : at home, [unfilled] , at the time of the visit. [Medical Office: ([facility])___] : at the medical office located in  [Verbal consent obtained from patient] : the patient, [unfilled] [FreeTextEntry1] : Ms. SOUMYA HICKS is a 42 year old female initially sent in a consult by Dr Rodriguez for  h/o prolactinoma was on Parlodel for many year , with breast discharge at diagnosis , months with no periods \par Dr Julissa Monson is her fertility doctor , trying to conceive , patient had IUI unsuccessful x1 not willing to do IVF due to the cost\par Gained 40 pounds after she got  was able to lose 40 pounds before her wedding afterwards she slowed down the exercise amount\par \par was told she has PCOS, with  irregular periods by prior Endo \par pituiatry adenoma 14yo ,  was initially on medication, then was stopped then 2yrs later growing in density per pt, was advised surgery , Dr [facility], removed 2004, when she was 23yo, small left over per pt, last scan 12 yrs ago with Dr Hutchinson , none since then\par \par retaining water, gained 40 lb  since end of last year\par \par was told she has only left ovary working , her  was checked and all good with him per pt \par was told she has low ovarian reserve by Dr Monson, has low normal estradiol with our labs third day of her period \par 2020 labs Dr Monson estradiol 37, with us 20\par \par has Mediterranean anemia \par A1c 5.6\par ferritin 13.4\par \par started period at 9yo \par \par OCP since 14yo up to 32yo \par \par \par was told prediabetes none

## 2022-10-25 NOTE — CONSULT LETTER
[Please see my note below.] : Please see my note below. [FreeTextEntry3] : Sincerely,\par \par RIYA BRADFORD MD\par [facility]\par [facility]\par

## 2022-10-25 NOTE — PHYSICAL EXAM
[Alert] : alert [Well Nourished] : well nourished [Obese] : obese [No Acute Distress] : no acute distress [Well Developed] : well developed [Normal Sclera/Conjunctiva] : normal sclera/conjunctiva [EOMI] : extra ocular movement intact [No Proptosis] : no proptosis [Normal Oropharynx] : the oropharynx was normal [No Respiratory Distress] : no respiratory distress [No Accessory Muscle Use] : no accessory muscle use [Clear to Auscultation] : lungs were clear to auscultation bilaterally [Normal S1, S2] : normal S1 and S2 [Normal Rate] : heart rate was normal [Regular Rhythm] : with a regular rhythm [No Edema] : no peripheral edema [Pedal Pulses Normal] : the pedal pulses are present [Normal Bowel Sounds] : normal bowel sounds [Not Tender] : non-tender [Not Distended] : not distended [Soft] : abdomen soft [Normal Anterior Cervical Nodes] : no anterior cervical lymphadenopathy [Normal Posterior Cervical Nodes] : no posterior cervical lymphadenopathy [No Spinal Tenderness] : no spinal tenderness [Spine Straight] : spine straight [No Stigmata of Cushings Syndrome] : no stigmata of Cushings Syndrome [Normal Gait] : normal gait [Normal Strength/Tone] : muscle strength and tone were normal [No Rash] : no rash [Normal Reflexes] : deep tendon reflexes were 2+ and symmetric [No Tremors] : no tremors [Oriented x3] : oriented to person, place, and time [Acanthosis Nigricans] : no acanthosis nigricans [de-identified] : Mildly enlarged thyroid on exam never had thyroid ultrasound

## 2022-10-29 ENCOUNTER — RESULT REVIEW (OUTPATIENT)
Age: 42
End: 2022-10-29

## 2022-10-31 ENCOUNTER — APPOINTMENT (OUTPATIENT)
Dept: HUMAN REPRODUCTION | Facility: CLINIC | Age: 42
End: 2022-10-31

## 2022-10-31 PROCEDURE — 76830 TRANSVAGINAL US NON-OB: CPT

## 2022-10-31 PROCEDURE — 99213 OFFICE O/P EST LOW 20 MIN: CPT | Mod: 25

## 2022-10-31 PROCEDURE — 36415 COLL VENOUS BLD VENIPUNCTURE: CPT

## 2022-11-03 ENCOUNTER — APPOINTMENT (OUTPATIENT)
Dept: HUMAN REPRODUCTION | Facility: CLINIC | Age: 42
End: 2022-11-03

## 2022-11-03 PROCEDURE — 36415 COLL VENOUS BLD VENIPUNCTURE: CPT

## 2022-11-03 PROCEDURE — 76857 US EXAM PELVIC LIMITED: CPT

## 2022-11-10 ENCOUNTER — APPOINTMENT (OUTPATIENT)
Dept: HUMAN REPRODUCTION | Facility: CLINIC | Age: 42
End: 2022-11-10

## 2022-11-10 PROCEDURE — 36415 COLL VENOUS BLD VENIPUNCTURE: CPT

## 2022-11-10 PROCEDURE — 76857 US EXAM PELVIC LIMITED: CPT

## 2022-11-15 ENCOUNTER — APPOINTMENT (OUTPATIENT)
Dept: HUMAN REPRODUCTION | Facility: CLINIC | Age: 42
End: 2022-11-15

## 2022-11-15 PROCEDURE — 99214 OFFICE O/P EST MOD 30 MIN: CPT | Mod: 95

## 2022-11-16 ENCOUNTER — APPOINTMENT (OUTPATIENT)
Dept: HUMAN REPRODUCTION | Facility: CLINIC | Age: 42
End: 2022-11-16

## 2022-11-16 PROCEDURE — 76857 US EXAM PELVIC LIMITED: CPT

## 2022-11-16 PROCEDURE — 36415 COLL VENOUS BLD VENIPUNCTURE: CPT

## 2022-11-18 ENCOUNTER — APPOINTMENT (OUTPATIENT)
Dept: HUMAN REPRODUCTION | Facility: CLINIC | Age: 42
End: 2022-11-18

## 2022-11-18 PROCEDURE — 76857 US EXAM PELVIC LIMITED: CPT

## 2022-11-18 PROCEDURE — 99213 OFFICE O/P EST LOW 20 MIN: CPT | Mod: 25

## 2022-11-18 PROCEDURE — 36415 COLL VENOUS BLD VENIPUNCTURE: CPT

## 2022-11-21 ENCOUNTER — APPOINTMENT (OUTPATIENT)
Dept: HUMAN REPRODUCTION | Facility: CLINIC | Age: 42
End: 2022-11-21

## 2022-11-21 PROCEDURE — 58322 ARTIFICIAL INSEMINATION: CPT

## 2022-11-21 PROCEDURE — 89261 SPERM ISOLATION COMPLEX: CPT

## 2022-12-05 ENCOUNTER — APPOINTMENT (OUTPATIENT)
Dept: HUMAN REPRODUCTION | Facility: CLINIC | Age: 42
End: 2022-12-05

## 2022-12-05 PROCEDURE — 36415 COLL VENOUS BLD VENIPUNCTURE: CPT

## 2023-01-11 ENCOUNTER — NON-APPOINTMENT (OUTPATIENT)
Age: 43
End: 2023-01-11

## 2023-01-11 ENCOUNTER — APPOINTMENT (OUTPATIENT)
Dept: NEUROLOGY | Facility: CLINIC | Age: 43
End: 2023-01-11
Payer: COMMERCIAL

## 2023-01-11 VITALS
HEIGHT: 62 IN | HEART RATE: 88 BPM | WEIGHT: 235 LBS | BODY MASS INDEX: 43.24 KG/M2 | DIASTOLIC BLOOD PRESSURE: 83 MMHG | SYSTOLIC BLOOD PRESSURE: 134 MMHG

## 2023-01-11 PROCEDURE — 99203 OFFICE O/P NEW LOW 30 MIN: CPT

## 2023-01-11 NOTE — CONSULT LETTER
[Dear  ___] : Dear  [unfilled], [Consult Letter:] : I had the pleasure of evaluating your patient, [unfilled]. [Please see my note below.] : Please see my note below. [Consult Closing:] : Thank you very much for allowing me to participate in the care of this patient.  If you have any questions, please do not hesitate to contact me. [Sincerely,] : Sincerely, [FreeTextEntry3] : Nan Garrett NClaudiaP.\par

## 2023-01-11 NOTE — PHYSICAL EXAM
[FreeTextEntry1] : Physical examination \par General: No acute distress, Awake, Alert.   \par other: paraspinal tenderness right lower back. \par \par Mental status \par Awake, alert, gives detailed history.\par \par Cranial Nerves \par II: VFF  \par III, IV, VI: PERRL, EOMI.   \par V: Facial sensation is normal B/L.   \par VII: Facial strength is normal B/L. \par \par \par VIII: Gross hearing is intact.   \par  \par \par IX, X: Palate is midline and elevates symmetrically.   \par XI: Trapezius normal strength.   \par XII: Tongue midline without atrophy or fasciculations. \par \par Motor exam  \par Muscle tone - no evidence of rigidity or resistance in all 4 extremities.  \par No atrophy or fasciculations \par Muscle Strength: arms and legs, proximal and distal flexors and extensors are normal \par \par No UE drift.\par \par Reflexes \par All present, normal, and symmetrical.   \par  \par Plantars right: mute.   \par Plantars left: mute.   \par  \par \par Coordination \par Finger to nose: Normal.  \par Heel to shin: Normal.   \par  \par \par Sensory \par Decreased PP in anterolateral thigh B. \par Intact cold and vibration. \par \par Gait \par Normal including heels, toes, and tandem gait.  \par

## 2023-01-11 NOTE — ASSESSMENT
[FreeTextEntry1] : Leonardo Banegas is a 42 year old woman with a history of PCOS, pituitary adenoma removal presenting with paresthesias and hyperpathia in bilateral thighs. \par \par Likely Meralgia paresthetica. \par She is actively trying to become pregnant. \par Would avoid any Lyrica, Gabapentin, or Cymbalta for neuropathic pain at this time as it is not safe in pregnancy.\par Lidocaine cream to localized area for relief. \par Referral to pain management for any other interventions. \par Avoid tight garments and belts. \par Advised walking daily to help with weight loss to help reduce pressure on the lateral femoral cutaneous nerve. \par PT referral.\par \par Low back pain and tenderness- there may be a component of lumbar radiculopathy.\par MRI lumbar spine. \par PT referral. \par \par Paresthesias in calves-this is not consistent with meralgia paresthetica. Will schedule EMG/NCV. \par Will hold off on serological workup at this time and reassess at next visit.\par \par Follow up after EMG.

## 2023-01-31 ENCOUNTER — TRANSCRIPTION ENCOUNTER (OUTPATIENT)
Age: 43
End: 2023-01-31

## 2023-01-31 ENCOUNTER — RESULT REVIEW (OUTPATIENT)
Age: 43
End: 2023-01-31

## 2023-02-06 ENCOUNTER — APPOINTMENT (OUTPATIENT)
Dept: ENDOCRINOLOGY | Facility: CLINIC | Age: 43
End: 2023-02-06
Payer: COMMERCIAL

## 2023-02-06 ENCOUNTER — NON-APPOINTMENT (OUTPATIENT)
Age: 43
End: 2023-02-06

## 2023-02-06 VITALS — HEIGHT: 62 IN | WEIGHT: 235 LBS | BODY MASS INDEX: 43.24 KG/M2

## 2023-02-06 LAB
ALBUMIN SERPL ELPH-MCNC: 4.6 G/DL
ALP BLD-CCNC: 132 U/L
ALT SERPL-CCNC: 249 U/L
ANION GAP SERPL CALC-SCNC: 10 MMOL/L
AST SERPL-CCNC: 123 U/L
BILIRUB SERPL-MCNC: 0.5 MG/DL
BUN SERPL-MCNC: 10 MG/DL
CALCIUM SERPL-MCNC: 9.7 MG/DL
CHLORIDE SERPL-SCNC: 102 MMOL/L
CHOLEST SERPL-MCNC: 211 MG/DL
CO2 SERPL-SCNC: 28 MMOL/L
CREAT SERPL-MCNC: 0.72 MG/DL
DHEA-S SERPL-MCNC: 217 UG/DL
EGFR: 108 ML/MIN/1.73M2
ESTIMATED AVERAGE GLUCOSE: 117 MG/DL
GLUCOSE SERPL-MCNC: 101 MG/DL
HBA1C MFR BLD HPLC: 5.7 %
HDLC SERPL-MCNC: 49 MG/DL
LDLC SERPL CALC-MCNC: 133 MG/DL
MONOMERIC PROLACTIN (ICMA)*: 6.62 NG/ML
NONHDLC SERPL-MCNC: 162 MG/DL
PERCENT MACROPROLACTIN: 28 %
POTASSIUM SERPL-SCNC: 4.7 MMOL/L
PROLACTIN SERPL-MCNC: 7 NG/ML
PROLACTIN, SERUM (ICMA)*: 9.17 NG/ML
PROT SERPL-MCNC: 7.1 G/DL
SODIUM SERPL-SCNC: 140 MMOL/L
TRIGL SERPL-MCNC: 147 MG/DL

## 2023-02-06 PROCEDURE — G0447 BEHAVIOR COUNSEL OBESITY 15M: CPT | Mod: 95

## 2023-02-06 PROCEDURE — 99215 OFFICE O/P EST HI 40 MIN: CPT | Mod: 25,95

## 2023-02-06 RX ORDER — METFORMIN ER 500 MG 500 MG/1
500 TABLET ORAL
Qty: 360 | Refills: 2 | Status: DISCONTINUED | COMMUNITY
Start: 2022-06-22 | End: 2023-02-06

## 2023-02-06 NOTE — CONSULT LETTER
[FreeTextEntry3] : Sincerely,\par \par RIYA BRADFORD MD\par [facility]\par [facility]\par

## 2023-02-06 NOTE — HISTORY OF PRESENT ILLNESS
[Home] : at home, [unfilled] , at the time of the visit. [Medical Office: ([facility])___] : at the medical office located in  [Verbal consent obtained from patient] : the patient, [unfilled] [FreeTextEntry1] : Ms. SOUMYA HICKS is a 42 year old female initially sent in a consult by Dr Rodriguez for  h/o prolactinoma was on Parlodel for many year , with breast discharge at diagnosis , months with no periods \par Last.  Was in December has had IUI unsuccessfully asking to try Mounjaro for weight loss stopped metformin about 2 weeks before her lab work showed elevated liver function test\par Reports feeling more depressed and eating worse since she cannot lose weight and cannot get pregnant\par \par Dr Julissa Monson is her fertility doctor , trying to conceive , patient had IUI unsuccessful x1 not willing to do IVF due to the cost\par Gained 40 pounds after she got  was able to lose 40 pounds before her wedding afterwards she slowed down the exercise amount\par \par was told she has PCOS, with  irregular periods by prior Endo \par pituiatry adenoma 14yo ,  was initially on medication, then was stopped then 2yrs later growing in density per pt, was advised surgery , Dr [facility], removed 2004, when she was 25yo, small left over per pt, last scan 12 yrs ago with Dr Hutchinson , none since then\par \par retaining water, gained 40 lb  since end of last year\par \par was told she has only left ovary working , her  was checked and all good with him per pt \par was told she has low ovarian reserve by Dr Monson, has low normal estradiol with our labs third day of her period \par 2020 labs Dr Monson estradiol 37, with us 20\par \par has Mediterranean anemia \par A1c 5.6\par ferritin 13.4\par \par started period at 9yo \par \par OCP since 14yo up to 30yo \par \par \par was told prediabetes

## 2023-02-06 NOTE — REVIEW OF SYSTEMS
[FreeTextEntry6] : Her  complaining of her snoring at night has also sinus problems per patient never had a sleep study [FreeTextEntry7] : some times depends on eating  [FreeTextEntry8] : gets it monthly  [de-identified] : loss of body hair

## 2023-02-06 NOTE — PHYSICAL EXAM
[Alert] : alert [Well Nourished] : well nourished [Obese] : obese [No Acute Distress] : no acute distress [Well Developed] : well developed [Normal Sclera/Conjunctiva] : normal sclera/conjunctiva [EOMI] : extra ocular movement intact [No Proptosis] : no proptosis [Normal Oropharynx] : the oropharynx was normal [No Respiratory Distress] : no respiratory distress [No Accessory Muscle Use] : no accessory muscle use [Clear to Auscultation] : lungs were clear to auscultation bilaterally [Normal S1, S2] : normal S1 and S2 [Normal Rate] : heart rate was normal [Regular Rhythm] : with a regular rhythm [No Edema] : no peripheral edema [Pedal Pulses Normal] : the pedal pulses are present [Normal Bowel Sounds] : normal bowel sounds [Not Tender] : non-tender [Not Distended] : not distended [Soft] : abdomen soft [Normal Anterior Cervical Nodes] : no anterior cervical lymphadenopathy [No Spinal Tenderness] : no spinal tenderness [Spine Straight] : spine straight [No Stigmata of Cushings Syndrome] : no stigmata of Cushings Syndrome [Normal Gait] : normal gait [Normal Strength/Tone] : muscle strength and tone were normal [No Rash] : no rash [Normal Reflexes] : deep tendon reflexes were 2+ and symmetric [No Tremors] : no tremors [Oriented x3] : oriented to person, place, and time

## 2023-02-06 NOTE — REVIEW OF SYSTEMS
[Recent Weight Gain (___ Lbs)] : recent weight gain: [unfilled] lbs [Lower Ext Edema] : lower extremity edema [Constipation] : constipation [Irregular Menses] : irregular menses [Pain/Numbness of Digits] : pain/numbness of digits

## 2023-02-06 NOTE — PHYSICAL EXAM
[Acanthosis Nigricans] : no acanthosis nigricans [de-identified] : Mildly enlarged thyroid on exam never had thyroid ultrasound

## 2023-02-10 ENCOUNTER — NON-APPOINTMENT (OUTPATIENT)
Age: 43
End: 2023-02-10

## 2023-02-10 ENCOUNTER — APPOINTMENT (OUTPATIENT)
Dept: CARDIOLOGY | Facility: CLINIC | Age: 43
End: 2023-02-10
Payer: COMMERCIAL

## 2023-02-10 VITALS
WEIGHT: 234.31 LBS | HEART RATE: 90 BPM | HEIGHT: 62 IN | SYSTOLIC BLOOD PRESSURE: 122 MMHG | BODY MASS INDEX: 43.12 KG/M2 | OXYGEN SATURATION: 98 % | TEMPERATURE: 97.6 F | DIASTOLIC BLOOD PRESSURE: 76 MMHG

## 2023-02-10 DIAGNOSIS — D56.9 THALASSEMIA, UNSPECIFIED: ICD-10-CM

## 2023-02-10 DIAGNOSIS — Z86.16 PERSONAL HISTORY OF COVID-19: ICD-10-CM

## 2023-02-10 PROCEDURE — 99215 OFFICE O/P EST HI 40 MIN: CPT | Mod: 25

## 2023-02-10 PROCEDURE — 93000 ELECTROCARDIOGRAM COMPLETE: CPT

## 2023-02-10 NOTE — HISTORY OF PRESENT ILLNESS
[FreeTextEntry1] : Ms stark has been followed here since 2017 for edema and water retention.\par \par She has polycystic ovarian syndrome, obesity, history of a prolactinoma, impaired glucose tolerance, infertility.\par She had 2- 3 IVF treatments thus far.\par \par  There have been no hospitalizations since last visit.\par She had Covid in January 2023. Her LFTs were noted to be elevated January 24 2023.(/alt 249). She denies abdominal discomfort. \par She denies chest pain, dyspnea, palpitations or syncope.\par \par .She is active in daily life but no formal exercise.  \par

## 2023-03-07 ENCOUNTER — APPOINTMENT (OUTPATIENT)
Dept: HUMAN REPRODUCTION | Facility: CLINIC | Age: 43
End: 2023-03-07
Payer: COMMERCIAL

## 2023-03-07 PROCEDURE — 36415 COLL VENOUS BLD VENIPUNCTURE: CPT

## 2023-03-13 ENCOUNTER — APPOINTMENT (OUTPATIENT)
Dept: HUMAN REPRODUCTION | Facility: CLINIC | Age: 43
End: 2023-03-13
Payer: COMMERCIAL

## 2023-03-13 PROCEDURE — 58340 CATHETER FOR HYSTEROGRAPHY: CPT

## 2023-03-13 PROCEDURE — 76831 ECHO EXAM UTERUS: CPT

## 2023-03-16 ENCOUNTER — APPOINTMENT (OUTPATIENT)
Dept: HEPATOLOGY | Facility: CLINIC | Age: 43
End: 2023-03-16
Payer: COMMERCIAL

## 2023-03-16 VITALS
BODY MASS INDEX: 41.46 KG/M2 | SYSTOLIC BLOOD PRESSURE: 133 MMHG | DIASTOLIC BLOOD PRESSURE: 84 MMHG | WEIGHT: 234 LBS | HEIGHT: 63 IN

## 2023-03-16 PROCEDURE — 99204 OFFICE O/P NEW MOD 45 MIN: CPT

## 2023-03-16 NOTE — HISTORY OF PRESENT ILLNESS
[de-identified] : Ms. HICKS is a 42 year old woman with seasonal allergies, obesity s/p liposuction around 2013, PCOS, prolactinoma, glucose intolerance, HLD, and infertility s/p in vitro insemination x 2-3, referred by her endocrinologist, Dr. Yan, because of elevated LFTs.\par AST/ALT, ALP were 123/249, 132 on 1/24/23. They were normal in June 2022. She lost 7 lbs in between. She had felt some sinusoidal congestion a few days before that, then got sick 3 days after the bloodwork and was diagnosed with COVID. Repeat on 2/09/23 was 44/102, 106.\par A hysteroscopy is planned as she had menorrhagia, and a clearance letter is required. \par Metformin was started in 6/2022. She has seen several nutritionists and was diagnosed for bariatric surgery in 2019, but this was delayed due to the COVID pandemia, and she then focused on fertility.  \par \par She denies jaundice, pruritus, joint pain, and significant fatigue.\par \par \par Alcohol history: rarely. SHx: parents are from [place].\par Weight history: 234 lbs, BMI 41.45 in 3/2023. Lost weight by dieting in the past, max. 70 lbs. Last had lost weight to 190 lbs around her wedding in 11/2021, regained weight after 6 months, now 50 lbs by March 2023. Max weight is current weight, 240 lbs. She eats three meals daily as she thinks it is healthy.\par Remedies/OTC meds: takes prn Zyrtec, occas. acetaminophen. Denies NSAIDs and antibiotics.\par \par ROS: \par Constitutional: no fever, fatigue, weight gain/loss. Eyes: no eye pain or discharge. ENT: no nosebleed, earache, change in hearing. CVS: denies chest pain, palpitations, claudication, irregular heartbeat. Resp: denies SOB, wheezing, cough, SOB on exertion. GI: denies abdominal pain, vomiting, diarrhea, heartburn, melena. Genitourinary: denies dysuria, incontinence. Musculoskeletal: denies joint pain, joint stiffness. Integumentary: denies pruritus, skin lesions, rash. Neuro: denies confusion, dizziness, fainting. Psych: denies anxiety, depression, change in personality. Endo: denies muscle weakness. Heme/lymph: denies easy bleeding and bruising.\par \par Workup:\par \par - colonoscopy:\par \par Physical exam:\par Gen: A&Ox3, NAD\par HEENT: normal outer ears & nose, PERRL, mucus membranes moist, anicteric, no lymphadenopathy\par CVS: regular rate and rhythm, no murmur\par Pulm: vesicular breath sounds\par Abdomen: normal bowel sounds, soft, nontender, no hepatosplenomegaly \par Legs: no edema, no clubbing\par Musculoskeletal: normal gait\par Skin: no rash\par Neuro: no asterixis, EOMI\par Psych: normal affect\par

## 2023-03-16 NOTE — ASSESSMENT
[FreeTextEntry1] : \par \par \par - elevated liver enzymes in January, in setting of COVID, improved 2 weeks later. \par   Serologies for hepatitis A, B, C, A1AT deficiency were negative. \par \par No liver-related contraindication to cystoscopy and/or curettage, or fertility treatment.\par \par Plan:\par - repeat LFTs today\par - US abdomen and fibroscan\par - recommend weight loss by dieting, agree with weight loss medications including GLP-1 agonists\par - return in 1 month

## 2023-03-17 LAB
ALBUMIN SERPL ELPH-MCNC: 4.5 G/DL
ALP BLD-CCNC: 102 U/L
ALT SERPL-CCNC: 26 U/L
ANION GAP SERPL CALC-SCNC: 16 MMOL/L
AST SERPL-CCNC: 15 U/L
BASOPHILS # BLD AUTO: 0.02 K/UL
BASOPHILS NFR BLD AUTO: 0.3 %
BILIRUB SERPL-MCNC: 0.3 MG/DL
BUN SERPL-MCNC: 10 MG/DL
CALCIUM SERPL-MCNC: 9.4 MG/DL
CHLORIDE SERPL-SCNC: 103 MMOL/L
CHOLEST SERPL-MCNC: 198 MG/DL
CO2 SERPL-SCNC: 20 MMOL/L
CREAT SERPL-MCNC: 0.7 MG/DL
DEPRECATED KAPPA LC FREE/LAMBDA SER: 1.27 RATIO
EGFR: 111 ML/MIN/1.73M2
EOSINOPHIL # BLD AUTO: 0.02 K/UL
EOSINOPHIL NFR BLD AUTO: 0.3 %
ESTIMATED AVERAGE GLUCOSE: 111 MG/DL
FERRITIN SERPL-MCNC: 30 NG/ML
GGT SERPL-CCNC: 38 U/L
GLUCOSE SERPL-MCNC: 96 MG/DL
HBA1C MFR BLD HPLC: 5.5 %
HBV SURFACE AB SER QL: NONREACTIVE
HCT VFR BLD CALC: 44 %
HDLC SERPL-MCNC: 55 MG/DL
HEPATITIS A IGG ANTIBODY: NONREACTIVE
HGB BLD-MCNC: 14.3 G/DL
IGA SER QL IEP: 192 MG/DL
IGG SER QL IEP: 913 MG/DL
IGM SER QL IEP: 98 MG/DL
IMM GRANULOCYTES NFR BLD AUTO: 0.1 %
INR PPP: 0.92 RATIO
IRON SATN MFR SERPL: 15 %
IRON SERPL-MCNC: 58 UG/DL
KAPPA LC CSF-MCNC: 1.16 MG/DL
KAPPA LC SERPL-MCNC: 1.47 MG/DL
LDLC SERPL CALC-MCNC: 113 MG/DL
LYMPHOCYTES # BLD AUTO: 2.21 K/UL
LYMPHOCYTES NFR BLD AUTO: 30.1 %
MAN DIFF?: NORMAL
MCHC RBC-ENTMCNC: 30.6 PG
MCHC RBC-ENTMCNC: 32.5 GM/DL
MCV RBC AUTO: 94.2 FL
MONOCYTES # BLD AUTO: 0.32 K/UL
MONOCYTES NFR BLD AUTO: 4.4 %
NEUTROPHILS # BLD AUTO: 4.76 K/UL
NEUTROPHILS NFR BLD AUTO: 64.8 %
NONHDLC SERPL-MCNC: 143 MG/DL
PLATELET # BLD AUTO: 380 K/UL
POTASSIUM SERPL-SCNC: 4.4 MMOL/L
PROT SERPL-MCNC: 6.9 G/DL
PT BLD: 10.7 SEC
RBC # BLD: 4.67 M/UL
RBC # FLD: 13.2 %
SMOOTH MUSCLE AB SER QL IF: ABNORMAL
SODIUM SERPL-SCNC: 139 MMOL/L
TIBC SERPL-MCNC: 395 UG/DL
TRIGL SERPL-MCNC: 153 MG/DL
UIBC SERPL-MCNC: 336 UG/DL
WBC # FLD AUTO: 7.34 K/UL

## 2023-03-20 LAB — ANA SER IF-ACNC: NEGATIVE

## 2023-03-21 ENCOUNTER — APPOINTMENT (OUTPATIENT)
Dept: NEUROLOGY | Facility: CLINIC | Age: 43
End: 2023-03-21
Payer: COMMERCIAL

## 2023-03-21 PROCEDURE — 95886 MUSC TEST DONE W/N TEST COMP: CPT

## 2023-03-21 PROCEDURE — 95910 NRV CNDJ TEST 7-8 STUDIES: CPT

## 2023-03-21 NOTE — PROCEDURE
[FreeTextEntry1] : EMG/NCS of both legs [FreeTextEntry3] : EMG/NCS of both legs shows abnormal insertional activity in the right lumbar paraspinals suggesting mild right lumbar radiculopathy.  No electrical correlate for left leg symptoms, but a normal EMG cannot rule out mild lumbar radiculopathy.\par \par EMG REPORT WILL BE UPLOADED SEPARATELY AS A PDF

## 2023-03-28 ENCOUNTER — APPOINTMENT (OUTPATIENT)
Dept: NEUROLOGY | Facility: CLINIC | Age: 43
End: 2023-03-28
Payer: COMMERCIAL

## 2023-03-28 VITALS
HEART RATE: 86 BPM | OXYGEN SATURATION: 97 % | HEIGHT: 63 IN | BODY MASS INDEX: 41.46 KG/M2 | DIASTOLIC BLOOD PRESSURE: 84 MMHG | SYSTOLIC BLOOD PRESSURE: 124 MMHG | WEIGHT: 234 LBS

## 2023-03-28 DIAGNOSIS — M54.50 LOW BACK PAIN, UNSPECIFIED: ICD-10-CM

## 2023-03-28 DIAGNOSIS — G57.11 MERALGIA PARESTHETICA, RIGHT LOWER LIMB: ICD-10-CM

## 2023-03-28 PROCEDURE — 99215 OFFICE O/P EST HI 40 MIN: CPT

## 2023-03-29 PROBLEM — M54.50 LOW BACK PAIN: Status: ACTIVE | Noted: 2023-01-11

## 2023-03-29 PROBLEM — G57.11 MERALGIA PARESTHETICA OF RIGHT SIDE: Status: ACTIVE | Noted: 2023-01-11

## 2023-03-29 NOTE — DATA REVIEWED
[de-identified] : \par  MRI Report             Final\par \par No Documents Attached\par \par \par \par \par   [facility]\par                                          [address]\par                                    [place], [place]  [address]\par                                        Department of Radiology\par                                             [phone]\par \par \par Patient Name:      SOUMYA HICKS                Location:       [facility]\par Med Rec #:        [id number]                    Account #:      [id number]\par YOB: 1980                    Ordering:       Dario Marte NP\par Age: 42               Sex:    F                 Attending:      Dario Marte NP\par PCP:        Kal Ceron MD\par ______________________________________________________________________________________\par \par Exam Date:      01/31/23\par Exam:         MRI LUMBAR SPINE\par Order#:       [id number]\par \par \par \par CLINICAL INFORMATION: Lower back pain\par \par  ADDITIONAL CLINICAL INFORMATION: Low back muscle strain S39.012A\par \par  TECHNIQUE: Multiplanar, multisequence MRI was performed of the lumbar spine.\par  IV Contrast: NONE\par \par  PRIOR STUDIES: No priors available for comparison.\par \par  FINDINGS:\par \par  LOCALIZER: No additional findings.\par  BONES: There is no fracture or bone marrow edema. There is mild intervertebral disc space narrowing\par at L5-S1 with Modic type II degenerative endplate fatty changes.\par  ALIGNMENT: The alignment is normal.\par  SACROILIAC JOINTS/SACRUM: There is no sacral fracture. The SI joints are partially visualized but\par are intact.\par  CONUS AND CAUDA EQUINA: The distal cord and conus are normal in signal. Conus terminates at L1.\par  VISUALIZED INTRAPELVIC/INTRA-ABDOMINAL SOFT TISSUES: Normal.\par  PARASPINAL SOFT TISSUES: Normal.\par \par \par  INDIVIDUAL LEVELS:\par \par  LOWER THORACIC SPINE: No spinal canal or neuroforaminal stenosis.\par \par  L1-L2: No spinal canal or neuroforaminal stenosis.\par  L2-L3: No spinal canal or neuroforaminal stenosis.\par  L3-L4: No spinal canal or neuroforaminal stenosis.\par  L4-L5: No spinal canal or neuroforaminal stenosis.\par  L5-S1: Disc bulge, bilateral facet arthropathy, ligamentous hypertrophy contributing to no\par significant canal stenosis and mild bilateral foraminal stenosis.\par \par \par \par  IMPRESSION:\par \par  Mild degenerative changes at L5-S1 contributing to mild bilateral foraminal stenosis, involving\par exiting L5 nerve roots. Otherwise no significant canal or foraminal stenosis of the lumbar spine.\par \par  --- End of Report ---\par \par ***Electronically Signed ***\par -----------------------------------------------\par Jc Peña MD              01/31/23 1657\par \par Dictated on 01/31/23\par \par \par Report cc:  Dario Marte NP;\par \par  \par \par  Ordered by: DARIO MARTE       Collected/Examined: 31Jan2023 04:10PM       \par Verified by: DARIO MARTE 31Jan2023 05:10PM       \par  Result Communication: Left message for patient;\par Stage: Final       \par  Performed at: [facility]       Resulted: 31Jan2023 04:57PM       Last Updated: 31Jan2023 05:10PM       Accession: [id number]        [de-identified] : Interpretation:\par This is a slightly abnormal EMG/NCS of the legs. There is electrophysiologic evidence of a mild non\par ocalizable right lumbar radiculopathy. No electrical correlate for left leg symptoms, but a normal EMG \par cannot rule out mild lumbar radiculopathy. There is no evidence for femoral neuropathy, lumbosacral \par plexopathy, myopathy, or polyneuropathy.

## 2023-03-29 NOTE — PHYSICAL EXAM
[FreeTextEntry1] : Physical examination \par General: No acute distress, Awake, Alert.   \par other: paraspinal tenderness right lower back. \par \par Mental status \par Awake, alert, gives detailed history.\par \par Cranial Nerves \par II: VFF  \par III, IV, VI: PERRL, EOMI.   \par V: Facial sensation is normal B/L.   \par VII: Facial strength is normal B/L. \par VIII: Gross hearing is intact.   \par  IX, X: Palate is midline and elevates symmetrically.   \par XI: Trapezius normal strength.   \par XII: Tongue midline without atrophy or fasciculations. \par \par Motor exam  \par Muscle tone - no evidence of rigidity or resistance in all 4 extremities.  \par No atrophy or fasciculations \par Muscle Strength: arms and legs, proximal and distal flexors and extensors are normal \par \par No UE drift.\par \par Reflexes \par All present, normal, and symmetrical.   \par  \par Plantars right: mute.   \par Plantars left: mute.   \par  \par \par Coordination \par Finger to nose: Normal.  \par Heel to shin: Normal.   \par  \par \par Sensory \par Decreased PP in anterolateral thigh B. \par Intact cold and vibration. \par \par Gait \par Normal

## 2023-03-29 NOTE — HISTORY OF PRESENT ILLNESS
[FreeTextEntry1] : Leonardo "Mi" Makenzie is a 42 year old woman with a history of pituitary adenoma removal, PCOS, presenting for a consultation for pins and needles in both thighs. \par \par \par notes tingling on the right and left side\par both sides have mild discomfort\par lidocaine does not help consistently\par \par PT was helping \par \par She was trying to get pregnant \par \par Saw hepatologist \par LFTs elevated - likely due to COVID - now normalized\par \par No period over 3 months - now going to get D&C.\par \par No loss of strength\par \par 1/11/23\par Leonardo "Mi" Makenzie is a 42 year old woman with a history of pituitary adenoma removal, PCOS, presenting for a consultation for pins and needles in both thighs. \par \par She endorses one year ago in both thighs right>left, she started having sporadic pins and needles now she has constant sensation of pins and needles with burning sensation to her thighs. She feels the pain is constant. Advil and Tylenol have provided no relief. Denies bowel or bladder difficulties. Has chronic low back pain and unclear if there is any pain radiating down the leg. Denies bowel or bladder difficulties. She endorses tingling in her lower calves both sides as well that started recently. Denies weakness. \par \par Endorses weight gain. Sees endocrinology and currently taking Metformin. \par  \par Social history: \par no smoking or alcohol use\par \par Family history:\par Mother- colon cancer with resection, hypertension.\par Father- Diabetes, hypertension  \par \par \par

## 2023-03-29 NOTE — ASSESSMENT
[FreeTextEntry1] : Bilateral lateral femoral cutaneous neuropathy\par \par Low back pain and tenderness- there may be a component of lumbar radiculopathy.\par Reviewed MRI lumbar spine and emg.\par \par consider restarting PT for now \par \par notes more tingling - \par consider Cymbalta \par \par Severe spasms of toes curling at night \par magnesium for cramps - \par diet tonic water\par \par Likely Meralgia paresthetica. \par She is actively trying to become pregnant. \par Would avoid any Lyrica, Gabapentin, or Cymbalta for neuropathic pain at this time as it is not safe in pregnancy.\par Lidocaine cream to localized area for relief- not working at present\par \par Avoid tight garments and belts. \par Advised walking daily to help with weight loss to help reduce pressure on the lateral femoral cutaneous nerve. \par \par fu as needed..\par

## 2023-03-29 NOTE — CONSULT LETTER
[Dear  ___] : Dear  [unfilled], [Courtesy Letter:] : I had the pleasure of seeing your patient, [unfilled], in my office today. [Please see my note below.] : Please see my note below. [Consult Closing:] : Thank you very much for allowing me to participate in the care of this patient.  If you have any questions, please do not hesitate to contact me. [Sincerely,] : Sincerely, [FreeTextEntry3] : Nan Garrett NClaudiaP.\par

## 2023-03-30 ENCOUNTER — APPOINTMENT (OUTPATIENT)
Dept: HEPATOLOGY | Facility: CLINIC | Age: 43
End: 2023-03-30

## 2023-04-06 ENCOUNTER — APPOINTMENT (OUTPATIENT)
Dept: HUMAN REPRODUCTION | Facility: CLINIC | Age: 43
End: 2023-04-06
Payer: COMMERCIAL

## 2023-04-06 PROCEDURE — 36415 COLL VENOUS BLD VENIPUNCTURE: CPT

## 2023-04-18 ENCOUNTER — APPOINTMENT (OUTPATIENT)
Dept: HUMAN REPRODUCTION | Facility: CLINIC | Age: 43
End: 2023-04-18
Payer: COMMERCIAL

## 2023-04-18 PROCEDURE — 36415 COLL VENOUS BLD VENIPUNCTURE: CPT

## 2023-04-19 ENCOUNTER — TRANSCRIPTION ENCOUNTER (OUTPATIENT)
Age: 43
End: 2023-04-19

## 2023-04-19 ENCOUNTER — APPOINTMENT (OUTPATIENT)
Dept: HUMAN REPRODUCTION | Facility: CLINIC | Age: 43
End: 2023-04-19
Payer: COMMERCIAL

## 2023-04-19 PROCEDURE — 58558Z: CUSTOM

## 2023-05-04 ENCOUNTER — APPOINTMENT (OUTPATIENT)
Dept: ENDOCRINOLOGY | Facility: CLINIC | Age: 43
End: 2023-05-04
Payer: COMMERCIAL

## 2023-05-04 ENCOUNTER — APPOINTMENT (OUTPATIENT)
Dept: HUMAN REPRODUCTION | Facility: CLINIC | Age: 43
End: 2023-05-04
Payer: COMMERCIAL

## 2023-05-04 VITALS
OXYGEN SATURATION: 98 % | SYSTOLIC BLOOD PRESSURE: 118 MMHG | HEART RATE: 115 BPM | DIASTOLIC BLOOD PRESSURE: 80 MMHG | BODY MASS INDEX: 40.75 KG/M2 | HEIGHT: 63 IN | WEIGHT: 230 LBS

## 2023-05-04 LAB
ALBUMIN SERPL ELPH-MCNC: 4.4 G/DL
ALP BLD-CCNC: 119 U/L
ALT SERPL-CCNC: 87 U/L
ANION GAP SERPL CALC-SCNC: 13 MMOL/L
AST SERPL-CCNC: 41 U/L
BILIRUB SERPL-MCNC: 0.3 MG/DL
BUN SERPL-MCNC: 13 MG/DL
CALCIUM SERPL-MCNC: 9.7 MG/DL
CHLORIDE SERPL-SCNC: 106 MMOL/L
CHOLEST SERPL-MCNC: 209 MG/DL
CO2 SERPL-SCNC: 25 MMOL/L
CREAT SERPL-MCNC: 0.63 MG/DL
EGFR: 114 ML/MIN/1.73M2
ESTIMATED AVERAGE GLUCOSE: 114 MG/DL
GLUCOSE SERPL-MCNC: 88 MG/DL
HBA1C MFR BLD HPLC: 5.6 %
HDLC SERPL-MCNC: 43 MG/DL
LDLC SERPL CALC-MCNC: 133 MG/DL
NONHDLC SERPL-MCNC: 166 MG/DL
POTASSIUM SERPL-SCNC: 4.1 MMOL/L
PROT SERPL-MCNC: 7.2 G/DL
SODIUM SERPL-SCNC: 144 MMOL/L
TRIGL SERPL-MCNC: 164 MG/DL

## 2023-05-04 PROCEDURE — G0447 BEHAVIOR COUNSEL OBESITY 15M: CPT

## 2023-05-04 PROCEDURE — 99215 OFFICE O/P EST HI 40 MIN: CPT | Mod: 25

## 2023-05-04 PROCEDURE — G0444 DEPRESSION SCREEN ANNUAL: CPT | Mod: 59

## 2023-05-04 PROCEDURE — 99214 OFFICE O/P EST MOD 30 MIN: CPT | Mod: 95

## 2023-05-04 RX ORDER — LIDOCAINE 5 G/100G
5 OINTMENT TOPICAL
Qty: 50 | Refills: 1 | Status: DISCONTINUED | COMMUNITY
Start: 2023-01-11 | End: 2023-05-04

## 2023-05-07 NOTE — ASSESSMENT
[0] : 2) Feeling down, depressed, or hopeless: Not at all (0) [PHQ-2 Negative - No further assessment needed] : PHQ-2 Negative - No further assessment needed [FreeTextEntry3] : Metformin [[id number]] : 0

## 2023-05-07 NOTE — CONSULT LETTER
[Please see my note below.] : Please see my note below. [FreeTextEntry3] : Sincerely,\par \par RIYA BRADFORD MD\par [facility]\par [facility]\par

## 2023-05-07 NOTE — REVIEW OF SYSTEMS
[Recent Weight Gain (___ Lbs)] : recent weight gain: [unfilled] lbs [Lower Ext Edema] : lower extremity edema [Constipation] : constipation [Irregular Menses] : irregular menses [Pain/Numbness of Digits] : pain/numbness of digits [FreeTextEntry6] : Her  complaining of her snoring at night has also sinus problems per patient never had a sleep study [FreeTextEntry7] : some times depends on eating  [FreeTextEntry8] : gets it monthly  [de-identified] : loss of body hair

## 2023-05-07 NOTE — HISTORY OF PRESENT ILLNESS
[Home] : at home, [unfilled] , at the time of the visit. [Medical Office: ([facility])___] : at the medical office located in  [Verbal consent obtained from patient] : the patient, [unfilled] [FreeTextEntry1] : Ms. SOUMYA HICKS is a 42 year old female initially sent in a consult by Dr Rodriguez for  h/o prolactinoma was on Parlodel for many year , with breast discharge at diagnosis , months with no periods \par Last period was in December has had IUI unsuccessfully asking to try Mounjaro 2.5 for weight loss stopped metformin about 2 weeks before her lab work showed elevated liver function test seen Dr Roth note reviewed was advised US and fibroscan and f/u one month not done per pt \par Reports feeling more depressed and eating worse since she cannot lose weight and cannot get pregnant\par \par Dr Julissa Monson is her fertility doctor , trying to conceive , patient had IUI unsuccessful x1 not willing to do IVF due to the cost\par Gained 40 pounds after she got  was able to lose 40 pounds before her wedding afterwards she slowed down the exercise amount\par \par was told she has PCOS, with  irregular periods by prior Endo \par pituiatry adenoma 16yo ,  was initially on medication, then was stopped then 2yrs later growing in density per pt, was advised surgery , Dr Hutchinson [facility], removed 2004, when she was 25yo, small left over per pt, last scan 12 yrs ago with Dr Hutchinson , none since then\par \par retaining water, gained 40 lb  since end of last year\par \par was told she has only left ovary working , her  was checked and all good with him per pt \par was told she has low ovarian reserve by Dr Monson, has low normal estradiol with our labs third day of her period \par 2020 labs Dr Monson estradiol 37, with us 20\par \par has Mediterranean anemia \par A1c 5.6\par ferritin 13.4\par \par started period at 11yo \par \par OCP since 16yo up to 30yo \par \par \par was told prediabetes

## 2023-05-07 NOTE — PHYSICAL EXAM
[Alert] : alert [Well Nourished] : well nourished [Obese] : obese [No Acute Distress] : no acute distress [Well Developed] : well developed [Normal Sclera/Conjunctiva] : normal sclera/conjunctiva [EOMI] : extra ocular movement intact [No Proptosis] : no proptosis [Normal Oropharynx] : the oropharynx was normal [No Respiratory Distress] : no respiratory distress [No Accessory Muscle Use] : no accessory muscle use [Clear to Auscultation] : lungs were clear to auscultation bilaterally [Normal S1, S2] : normal S1 and S2 [Normal Rate] : heart rate was normal [Regular Rhythm] : with a regular rhythm [No Edema] : no peripheral edema [Pedal Pulses Normal] : the pedal pulses are present [Normal Bowel Sounds] : normal bowel sounds [Not Tender] : non-tender [Not Distended] : not distended [Soft] : abdomen soft [Normal Anterior Cervical Nodes] : no anterior cervical lymphadenopathy [No Spinal Tenderness] : no spinal tenderness [Spine Straight] : spine straight [No Stigmata of Cushings Syndrome] : no stigmata of Cushings Syndrome [Normal Gait] : normal gait [Normal Strength/Tone] : muscle strength and tone were normal [No Rash] : no rash [Normal Reflexes] : deep tendon reflexes were 2+ and symmetric [No Tremors] : no tremors [Oriented x3] : oriented to person, place, and time [Acanthosis Nigricans] : no acanthosis nigricans [de-identified] : Mildly enlarged thyroid on exam never had thyroid ultrasound

## 2023-08-10 ENCOUNTER — APPOINTMENT (OUTPATIENT)
Dept: ENDOCRINOLOGY | Facility: CLINIC | Age: 43
End: 2023-08-10
Payer: COMMERCIAL

## 2023-08-10 VITALS
SYSTOLIC BLOOD PRESSURE: 118 MMHG | DIASTOLIC BLOOD PRESSURE: 72 MMHG | HEIGHT: 63 IN | HEART RATE: 111 BPM | WEIGHT: 237 LBS | OXYGEN SATURATION: 98 % | BODY MASS INDEX: 41.99 KG/M2

## 2023-08-10 LAB
ALBUMIN SERPL ELPH-MCNC: 4.4 G/DL
ALP BLD-CCNC: 112 U/L
ALT SERPL-CCNC: 43 U/L
ANION GAP SERPL CALC-SCNC: 11 MMOL/L
AST SERPL-CCNC: 26 U/L
BILIRUB SERPL-MCNC: 0.6 MG/DL
BUN SERPL-MCNC: 12 MG/DL
CALCIUM SERPL-MCNC: 9.2 MG/DL
CHLORIDE SERPL-SCNC: 104 MMOL/L
CHOLEST SERPL-MCNC: 168 MG/DL
CO2 SERPL-SCNC: 25 MMOL/L
CREAT SERPL-MCNC: 0.7 MG/DL
EGFR: 111 ML/MIN/1.73M2
ESTIMATED AVERAGE GLUCOSE: 111 MG/DL
GLUCOSE SERPL-MCNC: 96 MG/DL
HBA1C MFR BLD HPLC: 5.5 %
HDLC SERPL-MCNC: 47 MG/DL
LDLC SERPL CALC-MCNC: 97 MG/DL
NONHDLC SERPL-MCNC: 121 MG/DL
POTASSIUM SERPL-SCNC: 4.7 MMOL/L
PROLACTIN SERPL-MCNC: 12.9 NG/ML
PROLACTIN SERPL-MCNC: 13.4 NG/ML
PROT SERPL-MCNC: 6.9 G/DL
SODIUM SERPL-SCNC: 140 MMOL/L
T4 FREE SERPL-MCNC: 1.1 NG/DL
TRIGL SERPL-MCNC: 130 MG/DL
TSH SERPL-ACNC: 1.98 UIU/ML

## 2023-08-10 PROCEDURE — 99215 OFFICE O/P EST HI 40 MIN: CPT | Mod: 25

## 2023-08-10 PROCEDURE — G0447 BEHAVIOR COUNSEL OBESITY 15M: CPT | Mod: 59

## 2023-08-10 NOTE — CONSULT LETTER
[Please see my note below.] : Please see my note below. [FreeTextEntry3] : Sincerely,\par  \par  RIYA BRADFORD MD\par  [facility]\par  [facility]\par

## 2023-08-10 NOTE — ASSESSMENT
[0] : 2) Feeling down, depressed, or hopeless: Not at all (0) [PHQ-2 Negative - No further assessment needed] : PHQ-2 Negative - No further assessment needed [FreeTextEntry3] : Metformin [[id number]] : 0

## 2023-08-10 NOTE — REVIEW OF SYSTEMS
[Recent Weight Gain (___ Lbs)] : recent weight gain: [unfilled] lbs [Lower Ext Edema] : lower extremity edema [Constipation] : constipation [Irregular Menses] : irregular menses [Pain/Numbness of Digits] : pain/numbness of digits [FreeTextEntry6] : Her  complaining of her snoring at night has also sinus problems per patient never had a sleep study [FreeTextEntry7] : some times depends on eating  [FreeTextEntry8] : gets it monthly  [de-identified] : loss of body hair

## 2023-08-10 NOTE — PHYSICAL EXAM
[Alert] : alert [Well Nourished] : well nourished [Obese] : obese [No Acute Distress] : no acute distress [Well Developed] : well developed [Normal Sclera/Conjunctiva] : normal sclera/conjunctiva [EOMI] : extra ocular movement intact [No Proptosis] : no proptosis [Normal Oropharynx] : the oropharynx was normal [No Respiratory Distress] : no respiratory distress [No Accessory Muscle Use] : no accessory muscle use [Clear to Auscultation] : lungs were clear to auscultation bilaterally [Normal S1, S2] : normal S1 and S2 [Normal Rate] : heart rate was normal [Regular Rhythm] : with a regular rhythm [No Edema] : no peripheral edema [Pedal Pulses Normal] : the pedal pulses are present [Normal Bowel Sounds] : normal bowel sounds [Not Tender] : non-tender [Not Distended] : not distended [Soft] : abdomen soft [Normal Anterior Cervical Nodes] : no anterior cervical lymphadenopathy [No Spinal Tenderness] : no spinal tenderness [Spine Straight] : spine straight [No Stigmata of Cushings Syndrome] : no stigmata of Cushings Syndrome [Normal Gait] : normal gait [Normal Strength/Tone] : muscle strength and tone were normal [No Rash] : no rash [Normal Reflexes] : deep tendon reflexes were 2+ and symmetric [No Tremors] : no tremors [Oriented x3] : oriented to person, place, and time [Acanthosis Nigricans] : no acanthosis nigricans [de-identified] : Mildly enlarged thyroid on exam never had thyroid ultrasound

## 2023-08-10 NOTE — HISTORY OF PRESENT ILLNESS
[FreeTextEntry1] : Ms. SOUMYA HICKS is a 43 year old female initially sent in a consult by Dr Rodriguez for  h/o prolactinoma was on Parlodel for many year , with breast discharge at diagnosis , months with no periods  Last period was in December has had IUI unsuccessfully asking to try Mounjaro 2.5 for weight loss stopped metformin about 2 weeks before her lab work showed elevated liver function test seen Dr Roth note reviewed was advised US and fibroscan and f/u one month not done per pt  Reports feeling more depressed and eating worse since she cannot lose weight and cannot get pregnant  Dr Julissa Monson is her fertility doctor , trying to conceive , patient had IUI unsuccessful x1 not willing to do IVF due to the cost Gained 40 pounds after she got  was able to lose 40 pounds before her wedding afterwards she slowed down the exercise amount  was told she has PCOS, with  irregular periods by prior Endo  pituiatry adenoma 14yo ,  was initially on medication, then was stopped then 2yrs later growing in density per pt, was advised surgery , Dr [facility], removed 2004, when she was 25yo, small left over per pt, last scan 12 yrs ago with Dr Hutchinson , none since then  retaining water, gained 40 lb  since end of last year  was told she has only left ovary working , her  was checked and all good with him per pt  was told she has low ovarian reserve by Dr Monson, has low normal estradiol with our labs third day of her period  2020 labs Dr Monson estradiol 37, with us 20  has Mediterranean anemia  A1c 5.6 ferritin 13.4  started period at 9yo   OCP since 14yo up to 32yo    was told prediabetes

## 2023-08-17 ENCOUNTER — APPOINTMENT (OUTPATIENT)
Dept: CARDIOLOGY | Facility: CLINIC | Age: 43
End: 2023-08-17
Payer: COMMERCIAL

## 2023-08-17 ENCOUNTER — NON-APPOINTMENT (OUTPATIENT)
Age: 43
End: 2023-08-17

## 2023-08-17 VITALS
HEIGHT: 63 IN | BODY MASS INDEX: 42.88 KG/M2 | DIASTOLIC BLOOD PRESSURE: 84 MMHG | WEIGHT: 242 LBS | SYSTOLIC BLOOD PRESSURE: 124 MMHG

## 2023-08-17 PROCEDURE — 99214 OFFICE O/P EST MOD 30 MIN: CPT | Mod: 25

## 2023-08-17 PROCEDURE — 93000 ELECTROCARDIOGRAM COMPLETE: CPT

## 2023-08-17 RX ORDER — IPRATROPIUM BROMIDE 42 UG/1
0.06 SPRAY NASAL
Qty: 30 | Refills: 0 | Status: DISCONTINUED | COMMUNITY
Start: 2022-05-04 | End: 2023-08-17

## 2023-08-17 NOTE — CARDIOLOGY SUMMARY
[___] : [unfilled] [LVEF ___%] : LVEF [unfilled]% [de-identified] : 6/3/2021- bonilla cheng [de-identified] : 2017- ef 65%

## 2023-08-17 NOTE — HISTORY OF PRESENT ILLNESS
[FreeTextEntry1] : Ms stark has been followed here since 2017 for edema and water retention.  Since last visit She has not been hospitalized. She had positive covid antibodies ( was ill march 2020).  She has been working with Dr Julissa Monson for infertility, had a hysteroscopy and was treated with hormones.    She denies chest pain, dyspnea, palpitations or syncope.  She is active in daily life but no formal exercise.   She just returned from a cruise.

## 2023-08-29 ENCOUNTER — APPOINTMENT (OUTPATIENT)
Dept: NEUROLOGY | Facility: CLINIC | Age: 43
End: 2023-08-29
Payer: COMMERCIAL

## 2023-08-29 VITALS
HEART RATE: 107 BPM | OXYGEN SATURATION: 97 % | SYSTOLIC BLOOD PRESSURE: 110 MMHG | DIASTOLIC BLOOD PRESSURE: 75 MMHG | BODY MASS INDEX: 41.99 KG/M2 | HEIGHT: 63 IN | WEIGHT: 237 LBS

## 2023-08-29 PROCEDURE — 99214 OFFICE O/P EST MOD 30 MIN: CPT

## 2023-08-30 NOTE — HISTORY OF PRESENT ILLNESS
[FreeTextEntry1] : 8/29/23 Maranda is here in follow-up.  About a week and a half ago last Sunday, patient recalls having some numbness in her face.  She decided to go to bed.  In the morning when she was brushing her teeth she noticed that she could not spit out the toothpaste.  She saw that her face was drooping.  She ended up going to the emergency department.  A stroke work-up was initiated and was found to be normal.  She was started on prednisone and valacyclovir.  She does recall having some burning and some tiny lesions in the C2 distribution of the scalp.  The ED physician was concerned that she may have shingles.  There is still some burning in that distribution.  Her primary care physician also noticed these lesions.  She notes some mild right occipital notch tenderness.  She has finished the prednisone.  She is continuing valacyclovir for now.  Her primary care physician wants her to start gabapentin for the burning.  She has no other lateralizing deficits.  3/28/23 Leonardo Banegas (Deanna) is a 42 year old woman with a history of pituitary adenoma removal, PCOS, presenting for a consultation for pins and needles in both thighs.    notes tingling on the right and left side both sides have mild discomfort lidocaine does not help consistently  PT was helping   She was trying to get pregnant   Saw hepatologist  LFTs elevated - likely due to COVID - now normalized  No period over 3 months - now going to get D&C.  No loss of strength  1/11/23 Leonardo Banegas (Deanna) is a 42 year old woman with a history of pituitary adenoma removal, PCOS, presenting for a consultation for pins and needles in both thighs.   She endorses one year ago in both thighs right>left, she started having sporadic pins and needles now she has constant sensation of pins and needles with burning sensation to her thighs. She feels the pain is constant. Advil and Tylenol have provided no relief. Denies bowel or bladder difficulties. Has chronic low back pain and unclear if there is any pain radiating down the leg. Denies bowel or bladder difficulties. She endorses tingling in her lower calves both sides as well that started recently. Denies weakness.   Endorses weight gain. Sees endocrinology and currently taking Metformin.    Social history:  no smoking or alcohol use  Family history: Mother- colon cancer with resection, hypertension. Father- Diabetes, hypertension

## 2023-08-30 NOTE — ASSESSMENT
[FreeTextEntry1] : I agree that this is Bell's palsy.  She is going to complete the valacyclovir as prescribed by her primary.  I agree with starting gabapentin to help with the discomfort in the right occipital region.  I advised her to use Lacri-Lube and an eye patch to protect her eye.  She will keep me updated.  Bilateral lateral femoral cutaneous neuropathy The symptoms are stable and have not worsened.

## 2023-08-30 NOTE — CONSULT LETTER
[Dear  ___] : Dear  [unfilled], [Courtesy Letter:] : I had the pleasure of seeing your patient, [unfilled], in my office today. [Please see my note below.] : Please see my note below. [Consult Closing:] : Thank you very much for allowing me to participate in the care of this patient.  If you have any questions, please do not hesitate to contact me. [FreeTextEntry3] : Sincerely,  Kevin Guajardo M.D.

## 2023-08-30 NOTE — DATA REVIEWED
[de-identified] : \par   MRI Report             Final\par  \par  No Documents Attached\par  \par  \par  \par  \par    [facility]\par                                           [address]\par                                     [place], [place]  [address]\par                                         Department of Radiology\par                                              [phone]\par  \par  \par  Patient Name:      SOUMYA HICKS                Location:       [facility]\par  Med Rec #:        [id number]                    Account #:      [id number]\par  YOB: 1980                    Ordering:       Dario Marte NP\par  Age: 42               Sex:    F                 Attending:      Dario Marte NP\par  PCP:        Kal Ceron MD\par  ______________________________________________________________________________________\par  \par  Exam Date:      01/31/23\par  Exam:         MRI LUMBAR SPINE\par  Order#:       MRI [id number]\par  \par  \par  \par  CLINICAL INFORMATION: Lower back pain\par  \par   ADDITIONAL CLINICAL INFORMATION: Low back muscle strain S39.012A\par  \par   TECHNIQUE: Multiplanar, multisequence MRI was performed of the lumbar spine.\par   IV Contrast: NONE\par  \par   PRIOR STUDIES: No priors available for comparison.\par  \par   FINDINGS:\par  \par   LOCALIZER: No additional findings.\par   BONES: There is no fracture or bone marrow edema. There is mild intervertebral disc space narrowing\par  at L5-S1 with Modic type II degenerative endplate fatty changes.\par   ALIGNMENT: The alignment is normal.\par   SACROILIAC JOINTS/SACRUM: There is no sacral fracture. The SI joints are partially visualized but\par  are intact.\par   CONUS AND CAUDA EQUINA: The distal cord and conus are normal in signal. Conus terminates at L1.\par   VISUALIZED INTRAPELVIC/INTRA-ABDOMINAL SOFT TISSUES: Normal.\par   PARASPINAL SOFT TISSUES: Normal.\par  \par  \par   INDIVIDUAL LEVELS:\par  \par   LOWER THORACIC SPINE: No spinal canal or neuroforaminal stenosis.\par  \par   L1-L2: No spinal canal or neuroforaminal stenosis.\par   L2-L3: No spinal canal or neuroforaminal stenosis.\par   L3-L4: No spinal canal or neuroforaminal stenosis.\par   L4-L5: No spinal canal or neuroforaminal stenosis.\par   L5-S1: Disc bulge, bilateral facet arthropathy, ligamentous hypertrophy contributing to no\par  significant canal stenosis and mild bilateral foraminal stenosis.\par  \par  \par  \par   IMPRESSION:\par  \par   Mild degenerative changes at L5-S1 contributing to mild bilateral foraminal stenosis, involving\par  exiting L5 nerve roots. Otherwise no significant canal or foraminal stenosis of the lumbar spine.\par  \par   --- End of Report ---\par  \par  ***Electronically Signed ***\par  -----------------------------------------------\par  Jc Peña MD              01/31/23 1657\par  \par  Dictated on 01/31/23\par  \par  \par  Report cc:  Dario Marte NP;\par  \par   \par  \par   Ordered by: DARIO MARTE       Collected/Examined: 31Jan2023 04:10PM       \par  Verified by: DARIO MARTE 31Jan2023 05:10PM       \par   Result Communication: Left message for patient;\par  Stage: Final       \par   Performed at: [facility]       Resulted: 31Jan2023 04:57PM       Last Updated: 31Jan2023 05:10PM       Accession: [id number]        [de-identified] : Interpretation:\par  This is a slightly abnormal EMG/NCS of the legs. There is electrophysiologic evidence of a mild non\par  ocalizable right lumbar radiculopathy. No electrical correlate for left leg symptoms, but a normal EMG \par  cannot rule out mild lumbar radiculopathy. There is no evidence for femoral neuropathy, lumbosacral \par  plexopathy, myopathy, or polyneuropathy.

## 2023-08-30 NOTE — PHYSICAL EXAM
[FreeTextEntry1] : Physical examination  General: No acute distress, Awake, Alert.    other: paraspinal tenderness right lower back.   Mental status  Awake, alert, gives detailed history.  Cranial Nerves  II: VFF   III, IV, VI: PERRL, EOMI.    V: Facial sensation is reduced over the right face VII: Right facial paralysis (V1,2, 3)- along with inability to wrinkle forehead; dec NL fold on the right. Asymmetric smile; unable to close right eye fully VIII: Gross hearing is intact.     IX, X: Palate is midline and elevates symmetrically.    XI: Trapezius normal strength.    XII: Tongue midline without atrophy or fasciculations.   Motor exam   Muscle tone - no evidence of rigidity or resistance in all 4 extremities.   No atrophy or fasciculations  Muscle Strength: arms and legs, proximal and distal flexors and extensors are normal  No UE drift.  Reflexes  All present, normal, and symmetrical.     Coordination  Finger to nose: Normal.   Heel to shin: Normal.      Gait  Normal

## 2023-08-31 ENCOUNTER — APPOINTMENT (OUTPATIENT)
Dept: HEPATOLOGY | Facility: CLINIC | Age: 43
End: 2023-08-31

## 2023-08-31 VITALS
SYSTOLIC BLOOD PRESSURE: 130 MMHG | BODY MASS INDEX: 41.99 KG/M2 | WEIGHT: 237 LBS | HEIGHT: 63 IN | DIASTOLIC BLOOD PRESSURE: 80 MMHG

## 2023-08-31 NOTE — HISTORY OF PRESENT ILLNESS
[de-identified] : - 8/31/23: had BW three times - intermittent AST/ALT elevation 41/87 on 5/2/23. HbA1c normal since March.  Exam: 237 lbs, BMI 41.98 no change.   - 3/16/23: Ms. HICKS is a 42 year old woman with seasonal allergies, obesity s/p liposuction around 2013, PCOS, prolactinoma, glucose intolerance, HLD, and infertility s/p in vitro insemination x 2-3, referred by her endocrinologist, Dr. Yan, because of elevated LFTs. AST/ALT, ALP were 123/249, 132 on 1/24/23. They were normal in June 2022. She lost 7 lbs in between. She had felt some sinusoidal congestion a few days before that, then got sick 3 days after the bloodwork and was diagnosed with COVID. Repeat on 2/09/23 was 44/102, 106. A hysteroscopy is planned as she had menorrhagia, and a clearance letter is required.  Metformin was started in 6/2022. She has seen several nutritionists and was diagnosed for bariatric surgery in 2019, but this was delayed due to the COVID pandemia, and she then focused on fertility.    She denies jaundice, pruritus, joint pain, and significant fatigue.   Alcohol history: rarely. SHx: parents are from [place]. Weight history: 234 lbs, BMI 41.45 in 3/2023. Lost weight by dieting in the past, max. 70 lbs. Last had lost weight to 190 lbs around her wedding in 11/2021, regained weight after 6 months, now 50 lbs by March 2023. Max weight is current weight, 240 lbs. She eats three meals daily as she thinks it is healthy. Remedies/OTC meds: takes prn Zyrtec, occas. acetaminophen. Denies NSAIDs and antibiotics. Workup:  - colonoscopy:

## 2023-08-31 NOTE — ASSESSMENT
[FreeTextEntry1] :  - elevated liver enzymes in January, in setting of COVID, improved 2 weeks later.    Serologies for hepatitis A, B, C, A1AT deficiency were negative.    No liver-related contraindication to cystoscopy and/or curettage, or fertility treatment.  Plan: - HCV Ab - repeat LFTs today - US abdomen and fibroscan - recommend weight loss by dieting, agree with weight loss medications including GLP-1 agonists - return in 1 month

## 2023-11-03 LAB
MONOMERIC PROLACTIN (ICMA)*: 11.9 NG/ML
PERCENT MACROPROLACTIN: 14 %
PROLACTIN, SERUM (ICMA)*: 13.8 NG/ML

## 2023-11-07 ENCOUNTER — APPOINTMENT (OUTPATIENT)
Dept: ENDOCRINOLOGY | Facility: CLINIC | Age: 43
End: 2023-11-07
Payer: COMMERCIAL

## 2023-11-07 ENCOUNTER — RESULT REVIEW (OUTPATIENT)
Age: 43
End: 2023-11-07

## 2023-11-07 VITALS
DIASTOLIC BLOOD PRESSURE: 80 MMHG | OXYGEN SATURATION: 97 % | SYSTOLIC BLOOD PRESSURE: 124 MMHG | BODY MASS INDEX: 41.64 KG/M2 | HEART RATE: 94 BPM | HEIGHT: 63 IN | WEIGHT: 235 LBS

## 2023-11-07 LAB
ALBUMIN SERPL ELPH-MCNC: 4.3 G/DL
ALP BLD-CCNC: 116 U/L
ALT SERPL-CCNC: 34 U/L
ANION GAP SERPL CALC-SCNC: 11 MMOL/L
AST SERPL-CCNC: 23 U/L
BILIRUB SERPL-MCNC: 0.3 MG/DL
BUN SERPL-MCNC: 14 MG/DL
CALCIUM SERPL-MCNC: 9.5 MG/DL
CHLORIDE SERPL-SCNC: 107 MMOL/L
CHOLEST SERPL-MCNC: 188 MG/DL
CO2 SERPL-SCNC: 25 MMOL/L
CREAT SERPL-MCNC: 0.77 MG/DL
DHEA-S SERPL-MCNC: 250 UG/DL
EGFR: 99 ML/MIN/1.73M2
ESTIMATED AVERAGE GLUCOSE: 103 MG/DL
ESTRADIOL SERPL-MCNC: 18 PG/ML
FSH SERPL-MCNC: 25.5 IU/L
GLUCOSE SERPL-MCNC: 91 MG/DL
HBA1C MFR BLD HPLC: 5.2 %
HDLC SERPL-MCNC: 46 MG/DL
LDLC SERPL CALC-MCNC: 119 MG/DL
LH SERPL-ACNC: 20.2 IU/L
NONHDLC SERPL-MCNC: 142 MG/DL
POTASSIUM SERPL-SCNC: 4.3 MMOL/L
PROLACTIN SERPL-MCNC: 6.8 NG/ML
PROT SERPL-MCNC: 7.2 G/DL
SODIUM SERPL-SCNC: 143 MMOL/L
TRIGL SERPL-MCNC: 130 MG/DL

## 2023-11-07 PROCEDURE — G0447 BEHAVIOR COUNSEL OBESITY 15M: CPT | Mod: 59

## 2023-11-07 PROCEDURE — 99215 OFFICE O/P EST HI 40 MIN: CPT | Mod: 25

## 2023-11-09 ENCOUNTER — APPOINTMENT (OUTPATIENT)
Dept: HEPATOLOGY | Facility: CLINIC | Age: 43
End: 2023-11-09
Payer: COMMERCIAL

## 2023-11-09 VITALS
OXYGEN SATURATION: 97 % | HEIGHT: 61.5 IN | BODY MASS INDEX: 43.8 KG/M2 | HEART RATE: 91 BPM | SYSTOLIC BLOOD PRESSURE: 124 MMHG | DIASTOLIC BLOOD PRESSURE: 80 MMHG | WEIGHT: 235 LBS

## 2023-11-09 LAB — TESTOST SERPL-MCNC: 29 NG/DL

## 2023-11-09 PROCEDURE — 99214 OFFICE O/P EST MOD 30 MIN: CPT

## 2023-11-14 LAB — ANDROST SERPL-MCNC: 68 NG/DL

## 2023-11-14 RX ORDER — SEMAGLUTIDE 0.25 MG/.5ML
0.25 INJECTION, SOLUTION SUBCUTANEOUS
Qty: 1 | Refills: 0 | Status: DISCONTINUED | COMMUNITY
Start: 2023-11-09 | End: 2023-11-14

## 2023-11-15 ENCOUNTER — APPOINTMENT (OUTPATIENT)
Dept: OBGYN | Facility: CLINIC | Age: 43
End: 2023-11-15
Payer: COMMERCIAL

## 2023-11-15 VITALS
SYSTOLIC BLOOD PRESSURE: 130 MMHG | DIASTOLIC BLOOD PRESSURE: 90 MMHG | HEIGHT: 61.5 IN | BODY MASS INDEX: 43.99 KG/M2 | WEIGHT: 236 LBS

## 2023-11-15 PROCEDURE — 99396 PREV VISIT EST AGE 40-64: CPT

## 2023-11-24 ENCOUNTER — RESULT REVIEW (OUTPATIENT)
Age: 43
End: 2023-11-24

## 2023-11-28 LAB
C TRACH RRNA SPEC QL NAA+PROBE: NOT DETECTED
CYTOLOGY CVX/VAG DOC THIN PREP: NORMAL
HPV HIGH+LOW RISK DNA PNL CVX: NOT DETECTED
N GONORRHOEA RRNA SPEC QL NAA+PROBE: NOT DETECTED
SOURCE AMPLIFICATION: NORMAL

## 2023-12-14 NOTE — CONSULT LETTER
[FreeTextEntry3] : Sincerely,\par  \par  RIYA BRADFORD MD\par  [facility]\par  [facility]\par

## 2023-12-14 NOTE — PHYSICAL EXAM
[Acanthosis Nigricans] : no acanthosis nigricans [de-identified] : Mildly enlarged thyroid on exam never had thyroid ultrasound

## 2023-12-14 NOTE — REVIEW OF SYSTEMS
[FreeTextEntry6] : Her  complaining of her snoring at night has also sinus problems per patient never had a sleep study [FreeTextEntry7] : some times depends on eating  [FreeTextEntry8] : gets it monthly  [de-identified] : loss of body hair

## 2023-12-21 ENCOUNTER — APPOINTMENT (OUTPATIENT)
Dept: HUMAN REPRODUCTION | Facility: CLINIC | Age: 43
End: 2023-12-21
Payer: COMMERCIAL

## 2023-12-21 PROCEDURE — 99215 OFFICE O/P EST HI 40 MIN: CPT | Mod: 95

## 2023-12-26 ENCOUNTER — APPOINTMENT (OUTPATIENT)
Dept: HUMAN REPRODUCTION | Facility: CLINIC | Age: 43
End: 2023-12-26

## 2024-02-03 ENCOUNTER — LABORATORY RESULT (OUTPATIENT)
Age: 44
End: 2024-02-03

## 2024-02-06 ENCOUNTER — APPOINTMENT (OUTPATIENT)
Dept: ENDOCRINOLOGY | Facility: CLINIC | Age: 44
End: 2024-02-06
Payer: COMMERCIAL

## 2024-02-06 PROCEDURE — 99215 OFFICE O/P EST HI 40 MIN: CPT | Mod: 25

## 2024-02-06 PROCEDURE — G0447 BEHAVIOR COUNSEL OBESITY 15M: CPT | Mod: 95,59

## 2024-02-28 ENCOUNTER — NON-APPOINTMENT (OUTPATIENT)
Age: 44
End: 2024-02-28

## 2024-02-28 DIAGNOSIS — Z01.419 ENCOUNTER FOR GYNECOLOGICAL EXAMINATION (GENERAL) (ROUTINE) W/OUT ABNORMAL FINDINGS: ICD-10-CM

## 2024-02-28 DIAGNOSIS — N97.9 FEMALE INFERTILITY, UNSPECIFIED: ICD-10-CM

## 2024-02-28 DIAGNOSIS — G51.0 BELL'S PALSY: ICD-10-CM

## 2024-02-28 DIAGNOSIS — G57.12 MERALGIA PARESTHETICA, LEFT LOWER LIMB: ICD-10-CM

## 2024-02-28 DIAGNOSIS — Z12.39 ENCOUNTER FOR OTHER SCREENING FOR MALIGNANT NEOPLASM OF BREAST: ICD-10-CM

## 2024-02-28 NOTE — ASSESSMENT
[0] : 2) Feeling down, depressed, or hopeless: Not at all (0) [PHQ-2 Negative - No further assessment needed] : PHQ-2 Negative - No further assessment needed [FreeTextEntry3] : Metformin [[id number]] : 0

## 2024-02-28 NOTE — CONSULT LETTER
[Please see my note below.] : Please see my note below. [FreeTextEntry3] : Sincerely,\par  \par  RIYA BRADFORD MD\par  [facility]\par  [facility]\par

## 2024-02-28 NOTE — REVIEW OF SYSTEMS
[Recent Weight Gain (___ Lbs)] : recent weight gain: [unfilled] lbs [Lower Ext Edema] : lower extremity edema [Constipation] : constipation [Irregular Menses] : irregular menses [Pain/Numbness of Digits] : pain/numbness of digits [All other systems negative] : All other systems negative [FreeTextEntry6] : Her  complaining of her snoring at night has also sinus problems per patient never had a sleep study [FreeTextEntry7] : some times depends on eating  [FreeTextEntry8] : gets it monthly  [de-identified] : loss of body hair

## 2024-02-28 NOTE — PHYSICAL EXAM
[Alert] : alert [Well Nourished] : well nourished [Obese] : obese [No Acute Distress] : no acute distress [Well Developed] : well developed [Normal Sclera/Conjunctiva] : normal sclera/conjunctiva [EOMI] : extra ocular movement intact [No Proptosis] : no proptosis [Normal Oropharynx] : the oropharynx was normal [No Respiratory Distress] : no respiratory distress [No Accessory Muscle Use] : no accessory muscle use [Clear to Auscultation] : lungs were clear to auscultation bilaterally [Normal S1, S2] : normal S1 and S2 [Normal Rate] : heart rate was normal [Regular Rhythm] : with a regular rhythm [No Edema] : no peripheral edema [Pedal Pulses Normal] : the pedal pulses are present [Normal Bowel Sounds] : normal bowel sounds [Not Tender] : non-tender [Not Distended] : not distended [Soft] : abdomen soft [Normal Anterior Cervical Nodes] : no anterior cervical lymphadenopathy [No Spinal Tenderness] : no spinal tenderness [Spine Straight] : spine straight [No Stigmata of Cushings Syndrome] : no stigmata of Cushings Syndrome [Normal Gait] : normal gait [Normal Strength/Tone] : muscle strength and tone were normal [No Rash] : no rash [Normal Reflexes] : deep tendon reflexes were 2+ and symmetric [No Tremors] : no tremors [Oriented x3] : oriented to person, place, and time [Acanthosis Nigricans] : no acanthosis nigricans [de-identified] : Mildly enlarged thyroid on exam never had thyroid ultrasound

## 2024-02-28 NOTE — HISTORY OF PRESENT ILLNESS
[Home] : at home, [unfilled] , at the time of the visit. [Medical Office: ([facility])___] : at the medical office located in  [Verbal consent obtained from patient] : the patient, [unfilled] [FreeTextEntry1] : Ms. SOUMYA HICKS is a 43 year old female initially sent in a consult by Dr Rodriguez for  h/o prolactinoma was on Parlodel for many year , with breast discharge at diagnosis , months with no periods  Last period was in December has had IUI unsuccessfully asking to try Mounjaro 2.5 for weight loss stopped metformin about 2 weeks before her lab work showed elevated liver function test seen Dr Roth note reviewed was advised US and fibroscan and f/u one month not done per pt  Reports feeling more depressed and eating worse since she cannot lose weight and cannot get pregnant  Dr Julissa Monson is her fertility doctor , trying to conceive , patient had IUI unsuccessful x1 not willing to do IVF due to the cost Gained 40 pounds after she got  was able to lose 40 pounds before her wedding afterwards she slowed down the exercise amount  was told she has PCOS, with  irregular periods by prior Endo  pituiatry adenoma 16yo ,  was initially on medication, then was stopped then 2yrs later growing in density per pt, was advised surgery , Dr [facility], removed 2004, when she was 25yo, small left over per pt, last scan 12 yrs ago with Dr Hutchinson , none since then  retaining water, gained 40 lb  since end of last year  was told she has only left ovary working , her  was checked and all good with him per pt  was told she has low ovarian reserve by Dr Monson, has low normal estradiol with our labs third day of her period  2020 labs Dr Monson estradiol 37, with us 20  has Mediterranean anemia  A1c 5.6 ferritin 13.4  started period at 11yo   OCP since 16yo up to 30yo   was told prediabetes  2/6/24 tele follow up- Has not been able to get Mounjaro 10 mg from insurance lately. A1c increased to 5.7%. She is in agreement to restart metformin.   Labs reviewed with pt.  Prolactins good  Triglycerides increased from 130 to 163.  Glucose was high at 113. A1c increased to 5.7%.   Following with fertility specialist Dr Bird. Considering IVF. Last menstruation had some pain 1-2 days prior.   Having difficulty losing weight despite not eating as much. Busy at work lately. Has appetite still but not eating a lot at home. Considering trying Zepbound if she does not go through with IVF trial.

## 2024-02-29 ENCOUNTER — NON-APPOINTMENT (OUTPATIENT)
Age: 44
End: 2024-02-29

## 2024-02-29 ENCOUNTER — APPOINTMENT (OUTPATIENT)
Dept: CARDIOLOGY | Facility: CLINIC | Age: 44
End: 2024-02-29
Payer: COMMERCIAL

## 2024-02-29 VITALS
RESPIRATION RATE: 18 BRPM | BODY MASS INDEX: 46.07 KG/M2 | DIASTOLIC BLOOD PRESSURE: 84 MMHG | HEART RATE: 106 BPM | OXYGEN SATURATION: 98 % | HEIGHT: 61 IN | WEIGHT: 244 LBS | TEMPERATURE: 97.8 F | SYSTOLIC BLOOD PRESSURE: 126 MMHG

## 2024-02-29 PROCEDURE — 99214 OFFICE O/P EST MOD 30 MIN: CPT | Mod: 25

## 2024-02-29 PROCEDURE — 93000 ELECTROCARDIOGRAM COMPLETE: CPT

## 2024-02-29 RX ORDER — METFORMIN ER 500 MG 500 MG/1
500 TABLET ORAL
Qty: 360 | Refills: 1 | Status: DISCONTINUED | COMMUNITY
Start: 2024-02-06 | End: 2024-02-29

## 2024-02-29 RX ORDER — TIRZEPATIDE 10 MG/.5ML
10 INJECTION, SOLUTION SUBCUTANEOUS
Qty: 1 | Refills: 0 | Status: DISCONTINUED | COMMUNITY
Start: 2023-04-19 | End: 2024-02-29

## 2024-02-29 NOTE — HISTORY OF PRESENT ILLNESS
[FreeTextEntry1] : Ms stark has been followed here since 2017 for edema and water retention.  Since last visit She has not been hospitalized.  She has been working with Dr Julissa Monson for infertility now Dr Bird,  Now Dr Marion-she is considering IVF. She is now on metformin, could not get Mounjaro insurance wise and would like to be off prior to IVF.   She denies chest pain, dyspnea, palpitations or syncope.  She is active in daily life but no formal exercise.

## 2024-05-14 ENCOUNTER — APPOINTMENT (OUTPATIENT)
Dept: ENDOCRINOLOGY | Facility: CLINIC | Age: 44
End: 2024-05-14
Payer: COMMERCIAL

## 2024-05-14 VITALS
HEIGHT: 61 IN | WEIGHT: 239 LBS | OXYGEN SATURATION: 98 % | BODY MASS INDEX: 45.12 KG/M2 | DIASTOLIC BLOOD PRESSURE: 80 MMHG | HEART RATE: 99 BPM | SYSTOLIC BLOOD PRESSURE: 128 MMHG

## 2024-05-14 DIAGNOSIS — Z86.018 PERSONAL HISTORY OF OTHER BENIGN NEOPLASM: ICD-10-CM

## 2024-05-14 DIAGNOSIS — R73.09 OTHER ABNORMAL GLUCOSE: ICD-10-CM

## 2024-05-14 DIAGNOSIS — R79.89 OTHER SPECIFIED ABNORMAL FINDINGS OF BLOOD CHEMISTRY: ICD-10-CM

## 2024-05-14 DIAGNOSIS — E66.01 MORBID (SEVERE) OBESITY DUE TO EXCESS CALORIES: ICD-10-CM

## 2024-05-14 DIAGNOSIS — E01.0 IODINE-DEFICIENCY RELATED DIFFUSE (ENDEMIC) GOITER: ICD-10-CM

## 2024-05-14 DIAGNOSIS — R74.8 ABNORMAL LEVELS OF OTHER SERUM ENZYMES: ICD-10-CM

## 2024-05-14 DIAGNOSIS — E78.5 HYPERLIPIDEMIA, UNSPECIFIED: ICD-10-CM

## 2024-05-14 DIAGNOSIS — G47.33 OBSTRUCTIVE SLEEP APNEA (ADULT) (PEDIATRIC): ICD-10-CM

## 2024-05-14 DIAGNOSIS — E28.2 POLYCYSTIC OVARIAN SYNDROME: ICD-10-CM

## 2024-05-14 DIAGNOSIS — N92.6 IRREGULAR MENSTRUATION, UNSPECIFIED: ICD-10-CM

## 2024-05-14 LAB
ALBUMIN SERPL ELPH-MCNC: 4.5 G/DL
ALP BLD-CCNC: 85 U/L
ALT SERPL-CCNC: 24 U/L
ANION GAP SERPL CALC-SCNC: 14 MMOL/L
AST SERPL-CCNC: 18 U/L
BILIRUB SERPL-MCNC: 0.3 MG/DL
BUN SERPL-MCNC: 15 MG/DL
CALCIUM SERPL-MCNC: 8.9 MG/DL
CHLORIDE SERPL-SCNC: 104 MMOL/L
CHOLEST SERPL-MCNC: 184 MG/DL
CO2 SERPL-SCNC: 24 MMOL/L
CREAT SERPL-MCNC: 0.64 MG/DL
DHEA-S SERPL-MCNC: 1070 UG/DL
EGFR: 113 ML/MIN/1.73M2
ESTIMATED AVERAGE GLUCOSE: 103 MG/DL
GLUCOSE SERPL-MCNC: 94 MG/DL
HBA1C MFR BLD HPLC: 5.2 %
HCT VFR BLD CALC: 42.4 %
HDLC SERPL-MCNC: 52 MG/DL
HGB BLD-MCNC: 13.3 G/DL
LDLC SERPL CALC-MCNC: 113 MG/DL
MCHC RBC-ENTMCNC: 28.9 PG
MCHC RBC-ENTMCNC: 31.4 GM/DL
MCV RBC AUTO: 92.2 FL
NONHDLC SERPL-MCNC: 132 MG/DL
PLATELET # BLD AUTO: 363 K/UL
POTASSIUM SERPL-SCNC: 4.5 MMOL/L
PROLACTIN SERPL-MCNC: 10.9 NG/ML
PROLACTIN SERPL-MCNC: 10.9 NG/ML
PROT SERPL-MCNC: 7.1 G/DL
RBC # BLD: 4.6 M/UL
RBC # FLD: 13.7 %
SODIUM SERPL-SCNC: 142 MMOL/L
TESTOST SERPL-MCNC: 128 NG/DL
TRIGL SERPL-MCNC: 102 MG/DL
WBC # FLD AUTO: 10.03 K/UL

## 2024-05-14 PROCEDURE — 99215 OFFICE O/P EST HI 40 MIN: CPT

## 2024-05-14 RX ORDER — GABAPENTIN 100 MG
100 TABLET ORAL
Refills: 0 | Status: ACTIVE | COMMUNITY

## 2024-05-14 RX ORDER — METFORMIN ER 500 MG 500 MG/1
500 TABLET ORAL
Qty: 4 | Refills: 3 | Status: ACTIVE | COMMUNITY
Start: 2024-05-14 | End: 1900-01-01

## 2024-05-14 RX ORDER — MECOBALAMIN 1000 MCG
TABLET,CHEWABLE ORAL
Refills: 0 | Status: ACTIVE | COMMUNITY

## 2024-05-14 NOTE — REASON FOR VISIT
Applied [Follow - Up] : a follow-up visit [Weight Management/Obesity] : weight management/obesity [PCOS] : PCOS [Other___] : [unfilled]

## 2024-05-14 NOTE — PHYSICAL EXAM
[Alert] : alert [Well Nourished] : well nourished [Obese] : obese [No Acute Distress] : no acute distress [Well Developed] : well developed [Normal Sclera/Conjunctiva] : normal sclera/conjunctiva [EOMI] : extra ocular movement intact [No Proptosis] : no proptosis [Normal Oropharynx] : the oropharynx was normal [No Respiratory Distress] : no respiratory distress [No Accessory Muscle Use] : no accessory muscle use [Clear to Auscultation] : lungs were clear to auscultation bilaterally [Normal S1, S2] : normal S1 and S2 [Normal Rate] : heart rate was normal [Regular Rhythm] : with a regular rhythm [No Edema] : no peripheral edema [Pedal Pulses Normal] : the pedal pulses are present [Normal Bowel Sounds] : normal bowel sounds [Not Tender] : non-tender [Not Distended] : not distended [Soft] : abdomen soft [Normal Anterior Cervical Nodes] : no anterior cervical lymphadenopathy [No Spinal Tenderness] : no spinal tenderness [Spine Straight] : spine straight [No Stigmata of Cushings Syndrome] : no stigmata of Cushings Syndrome [Normal Gait] : normal gait [Normal Strength/Tone] : muscle strength and tone were normal [No Rash] : no rash [Normal Reflexes] : deep tendon reflexes were 2+ and symmetric [No Tremors] : no tremors [Oriented x3] : oriented to person, place, and time [Acanthosis Nigricans] : no acanthosis nigricans [de-identified] : Mildly enlarged thyroid on exam never had thyroid ultrasound

## 2024-05-14 NOTE — REVIEW OF SYSTEMS
[Recent Weight Gain (___ Lbs)] : recent weight gain: [unfilled] lbs [Lower Ext Edema] : lower extremity edema [Constipation] : constipation [Irregular Menses] : irregular menses [Pain/Numbness of Digits] : pain/numbness of digits [All other systems negative] : All other systems negative [FreeTextEntry6] : Her  complaining of her snoring at night has also sinus problems per patient never had a sleep study [FreeTextEntry7] : some times depends on eating  [FreeTextEntry8] : gets it monthly  [de-identified] : loss of body hair

## 2024-05-14 NOTE — END OF VISIT
[FreeTextEntry3] :  I, Jame Pack, am scribing for and in the presence of Dr. Asuncion Yan in the following sections: HISTORY OF PRESENT ILLNESS; REVIEW OF SYSTEMS; PHYSICAL EXAM; ASSESSMENT/ PLAN. I, Asuncion Yan, personally performed the services described in the documentation, reviewed the documentation recorded by the scribe in my presence, and it accurately and completely records my words and actions. 5/14/2024. [Time Spent: ___ minutes] : I have spent [unfilled] minutes of time on the encounter.

## 2024-05-14 NOTE — HISTORY OF PRESENT ILLNESS
[Home] : at home, [unfilled] , at the time of the visit. [Medical Office: ([facility])___] : at the medical office located in  [Verbal consent obtained from patient] : the patient, [unfilled] [FreeTextEntry1] : Ms. SOUMYA HICKS is a 43 year old female initially sent in a consult by Dr Rodriguez for  h/o prolactinoma was on Parlodel for many year , with breast discharge at diagnosis , months with no periods  Last period was in December has had IUI unsuccessfully asking to try Mounjaro 2.5 for weight loss stopped metformin about 2 weeks before her lab work showed elevated liver function test seen Dr Roth note reviewed was advised US and fibroscan and f/u one month not done per pt  Reports feeling more depressed and eating worse since she cannot lose weight and cannot get pregnant  Dr Julissa Monson is her fertility doctor , trying to conceive , patient had IUI unsuccessful x1 not willing to do IVF due to the cost Gained 40 pounds after she got  was able to lose 40 pounds before her wedding afterwards she slowed down the exercise amount  was told she has PCOS, with  irregular periods by prior Endo  pituiatry adenoma 16yo ,  was initially on medication, then was stopped then 2yrs later growing in density per pt, was advised surgery , Dr [facility], removed 2004, when she was 23yo, small left over per pt, last scan 12 yrs ago with Dr Hutchinson , none since then  retaining water, gained 40 lb  since end of last year  was told she has only left ovary working , her  was checked and all good with him per pt  was told she has low ovarian reserve by Dr Monson, has low normal estradiol with our labs third day of her period  2020 labs Dr Monson estradiol 37, with us 20  has Mediterranean anemia  A1c 5.6 ferritin 13.4  started period at 9yo   OCP since 16yo up to 30yo   was told prediabetes  2/6/24 tele follow up- Has not been able to get Mounjaro 10 mg from insurance lately. A1c increased to 5.7%. She is in agreement to restart metformin.   Labs reviewed with pt.  Prolactins good  Triglycerides increased from 130 to 163.  Glucose was high at 113. A1c increased to 5.7%.   Following with fertility specialist Dr Bird. Considering IVF. Last menstruation had some pain 1-2 days prior.   Having difficulty losing weight despite not eating as much. Busy at work lately. Has appetite still but not eating a lot at home. Considering trying Zepbound if she does not go through with IVF trial.    5/14/24 follow up-  Continuing to follow with Dr. Bird and has initiated IVF. Undergoing hormone replacement injections. Going well so far.  A1c down from 5.7 to 5.2. Also lost 5 lbs. Doing well on metformin 500 mg 2 tabs BID. Denies side effects.  No longer has PCOS.

## 2024-05-17 LAB — ANDROSTERONE SERPL-MCNC: 110 NG/DL

## 2024-08-13 ENCOUNTER — APPOINTMENT (OUTPATIENT)
Dept: ENDOCRINOLOGY | Facility: CLINIC | Age: 44
End: 2024-08-13
Payer: COMMERCIAL

## 2024-08-13 VITALS
BODY MASS INDEX: 44.93 KG/M2 | OXYGEN SATURATION: 98 % | HEIGHT: 61 IN | HEART RATE: 94 BPM | SYSTOLIC BLOOD PRESSURE: 122 MMHG | DIASTOLIC BLOOD PRESSURE: 80 MMHG | WEIGHT: 238 LBS

## 2024-08-13 DIAGNOSIS — N92.6 IRREGULAR MENSTRUATION, UNSPECIFIED: ICD-10-CM

## 2024-08-13 DIAGNOSIS — E28.2 POLYCYSTIC OVARIAN SYNDROME: ICD-10-CM

## 2024-08-13 DIAGNOSIS — E78.5 HYPERLIPIDEMIA, UNSPECIFIED: ICD-10-CM

## 2024-08-13 DIAGNOSIS — R79.89 OTHER SPECIFIED ABNORMAL FINDINGS OF BLOOD CHEMISTRY: ICD-10-CM

## 2024-08-13 DIAGNOSIS — E01.0 IODINE-DEFICIENCY RELATED DIFFUSE (ENDEMIC) GOITER: ICD-10-CM

## 2024-08-13 DIAGNOSIS — Z86.018 PERSONAL HISTORY OF OTHER BENIGN NEOPLASM: ICD-10-CM

## 2024-08-13 DIAGNOSIS — E66.01 MORBID (SEVERE) OBESITY DUE TO EXCESS CALORIES: ICD-10-CM

## 2024-08-13 DIAGNOSIS — R73.09 OTHER ABNORMAL GLUCOSE: ICD-10-CM

## 2024-08-13 DIAGNOSIS — G47.33 OBSTRUCTIVE SLEEP APNEA (ADULT) (PEDIATRIC): ICD-10-CM

## 2024-08-13 DIAGNOSIS — R74.8 ABNORMAL LEVELS OF OTHER SERUM ENZYMES: ICD-10-CM

## 2024-08-13 PROCEDURE — 99215 OFFICE O/P EST HI 40 MIN: CPT

## 2024-08-13 RX ORDER — PRASTERONE (DHEA) 25 MG
25 CAPSULE ORAL
Refills: 0 | Status: ACTIVE | COMMUNITY

## 2024-08-13 RX ORDER — CRANBERRY FRUIT EXTRACT 650 MG
CAPSULE ORAL
Refills: 0 | Status: ACTIVE | COMMUNITY

## 2024-08-14 NOTE — PHYSICAL EXAM
[Alert] : alert [Well Nourished] : well nourished [Obese] : obese [No Acute Distress] : no acute distress [Well Developed] : well developed [Normal Sclera/Conjunctiva] : normal sclera/conjunctiva [EOMI] : extra ocular movement intact [No Proptosis] : no proptosis [Normal Oropharynx] : the oropharynx was normal [No Respiratory Distress] : no respiratory distress [No Accessory Muscle Use] : no accessory muscle use [Clear to Auscultation] : lungs were clear to auscultation bilaterally [Normal S1, S2] : normal S1 and S2 [Normal Rate] : heart rate was normal [Regular Rhythm] : with a regular rhythm [No Edema] : no peripheral edema [Pedal Pulses Normal] : the pedal pulses are present [Normal Bowel Sounds] : normal bowel sounds [Not Tender] : non-tender [Not Distended] : not distended [Soft] : abdomen soft [Normal Anterior Cervical Nodes] : no anterior cervical lymphadenopathy [No Spinal Tenderness] : no spinal tenderness [Spine Straight] : spine straight [No Stigmata of Cushings Syndrome] : no stigmata of Cushings Syndrome [Normal Gait] : normal gait [Normal Strength/Tone] : muscle strength and tone were normal [No Rash] : no rash [Normal Reflexes] : deep tendon reflexes were 2+ and symmetric [No Tremors] : no tremors [Oriented x3] : oriented to person, place, and time [Acanthosis Nigricans] : no acanthosis nigricans [de-identified] : Mildly enlarged thyroid on exam never had thyroid ultrasound

## 2024-08-14 NOTE — REASON FOR VISIT
[Follow - Up] : a follow-up visit [Weight Management/Obesity] : weight management/obesity [PCOS] : PCOS [Other___] : [unfilled] [Family Member] : family member

## 2024-08-14 NOTE — HISTORY OF PRESENT ILLNESS
[FreeTextEntry1] : Ms. SOUMYA HICKS is a 43 year old female initially sent in a consult by Dr Rodriguez for  h/o prolactinoma was on Parlodel for many year , with breast discharge at diagnosis , months with no periods  Last period was in December has had IUI unsuccessfully asking to try Mounjaro 2.5 for weight loss stopped metformin about 2 weeks before her lab work showed elevated liver function test seen Dr oRth note reviewed was advised US and fibroscan and f/u one month not done per pt  Reports feeling more depressed and eating worse since she cannot lose weight and cannot get pregnant  Dr Julissa Monson is her fertility doctor , trying to conceive , patient had IUI unsuccessful x1 not willing to do IVF due to the cost Gained 40 pounds after she got  was able to lose 40 pounds before her wedding afterwards she slowed down the exercise amount  was told she has PCOS, with  irregular periods by prior Endo  pituiatry adenoma 14yo ,  was initially on medication, then was stopped then 2yrs later growing in density per pt, was advised surgery , Dr [facility], removed 2004, when she was 23yo, small left over per pt, last scan 12 yrs ago with Dr Hutchinson , none since then  retaining water, gained 40 lb  since end of last year  was told she has only left ovary working , her  was checked and all good with him per pt  was told she has low ovarian reserve by Dr Monson, has low normal estradiol with our labs third day of her period  2020 labs Dr Monson estradiol 37, with us 20  has Mediterranean anemia  A1c 5.6 ferritin 13.4  started period at 11yo   OCP since 14yo up to 30yo   was told prediabetes  2/6/24 tele follow up- Has not been able to get Mounjaro 10 mg from insurance lately. A1c increased to 5.7%. She is in agreement to restart metformin.   Labs reviewed with pt.  Prolactins good  Triglycerides increased from 130 to 163.  Glucose was high at 113. A1c increased to 5.7%.   Following with fertility specialist Dr Bird. Considering IVF. Last menstruation had some pain 1-2 days prior.   Having difficulty losing weight despite not eating as much. Busy at work lately. Has appetite still but not eating a lot at home. Considering trying Zepbound if she does not go through with IVF trial.    5/14/24 follow up-  Continuing to follow with Dr. Bird and has initiated IVF. Undergoing hormone replacement injections. Going well so far.  A1c down from 5.7 to 5.2. Also lost 5 lbs. Doing well on metformin 500 mg 2 tabs BID. Denies side effects.  No longer has PCOS.   8/13/24 Dr Bishop Jade  [place] [facility] fertility specialist started her on DHEAS months now , on her third persaud , was advised to add dexamethasone to suppress it was told perimenopuase  she still has regular periods  also she was advised Zomactin , she did not want to pay , will need it for 20 days, planning to try with her next IVF cycle  did not start dexamethasone yet only to bring down DHEAS will start another IVF cycle

## 2024-08-14 NOTE — HISTORY OF PRESENT ILLNESS
[FreeTextEntry1] : Ms. SOUMYA HICKS is a 43 year old female initially sent in a consult by Dr Rodriguez for  h/o prolactinoma was on Parlodel for many year , with breast discharge at diagnosis , months with no periods  Last period was in December has had IUI unsuccessfully asking to try Mounjaro 2.5 for weight loss stopped metformin about 2 weeks before her lab work showed elevated liver function test seen Dr Roth note reviewed was advised US and fibroscan and f/u one month not done per pt  Reports feeling more depressed and eating worse since she cannot lose weight and cannot get pregnant  Dr Julissa Monson is her fertility doctor , trying to conceive , patient had IUI unsuccessful x1 not willing to do IVF due to the cost Gained 40 pounds after she got  was able to lose 40 pounds before her wedding afterwards she slowed down the exercise amount  was told she has PCOS, with  irregular periods by prior Endo  pituiatry adenoma 14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 ,  was initially on medication, then was stopped then 2yrs later growing in density per pt, was advised surgery , Dr [facility], removed 2004, when she was 23yo, small left over per pt, last scan 12 yrs ago with Dr Hutchinson , none since then  retaining water, gained 40 lb  since end of last year  was told she has only left ovary working , her  was checked and all good with him per pt  was told she has low ovarian reserve by Dr Monson, has low normal estradiol with our labs third day of her period  2020 labs Dr Monson estradiol 37, with us 20  has Mediterranean anemia  A1c 5.6 ferritin 13.4  started period at 11yo   OCP since 14yo up to 30yo   was told prediabetes  2/6/24 tele follow up- Has not been able to get Mounjaro 10 mg from insurance lately. A1c increased to 5.7%. She is in agreement to restart metformin.   Labs reviewed with pt.  Prolactins good  Triglycerides increased from 130 to 163.  Glucose was high at 113. A1c increased to 5.7%.   Following with fertility specialist Dr Bird. Considering IVF. Last menstruation had some pain 1-2 days prior.   Having difficulty losing weight despite not eating as much. Busy at work lately. Has appetite still but not eating a lot at home. Considering trying Zepbound if she does not go through with IVF trial.    5/14/24 follow up-  Continuing to follow with Dr. Bird and has initiated IVF. Undergoing hormone replacement injections. Going well so far.  A1c down from 5.7 to 5.2. Also lost 5 lbs. Doing well on metformin 500 mg 2 tabs BID. Denies side effects.  No longer has PCOS.   8/13/24 Dr Bishop Jade  [place] fertility specialist started her on DHEAS months now , on her third persaud , was advised to add dexamethasone to suppress it was told perimenopuase  she still has regular periods  also she was advised Zomactin , she did not want to pay , will need it for 20 days, planning to try with her next IVF cycle  did not start dexamethasone yet only to bring down DHEAS will start another IVF cycle

## 2024-08-14 NOTE — PHYSICAL EXAM
[Alert] : alert [Well Nourished] : well nourished [Obese] : obese [No Acute Distress] : no acute distress [Well Developed] : well developed [Normal Sclera/Conjunctiva] : normal sclera/conjunctiva [EOMI] : extra ocular movement intact [No Proptosis] : no proptosis [Normal Oropharynx] : the oropharynx was normal [No Respiratory Distress] : no respiratory distress [No Accessory Muscle Use] : no accessory muscle use [Clear to Auscultation] : lungs were clear to auscultation bilaterally [Normal S1, S2] : normal S1 and S2 [Normal Rate] : heart rate was normal [Regular Rhythm] : with a regular rhythm [No Edema] : no peripheral edema [Pedal Pulses Normal] : the pedal pulses are present [Normal Bowel Sounds] : normal bowel sounds [Not Tender] : non-tender [Not Distended] : not distended [Soft] : abdomen soft [Normal Anterior Cervical Nodes] : no anterior cervical lymphadenopathy [No Spinal Tenderness] : no spinal tenderness [Spine Straight] : spine straight [No Stigmata of Cushings Syndrome] : no stigmata of Cushings Syndrome [Normal Gait] : normal gait [Normal Strength/Tone] : muscle strength and tone were normal [No Rash] : no rash [Normal Reflexes] : deep tendon reflexes were 2+ and symmetric [No Tremors] : no tremors [Oriented x3] : oriented to person, place, and time [Acanthosis Nigricans] : no acanthosis nigricans [de-identified] : Mildly enlarged thyroid on exam never had thyroid ultrasound

## 2024-08-14 NOTE — REVIEW OF SYSTEMS
[Recent Weight Gain (___ Lbs)] : recent weight gain: [unfilled] lbs [Lower Ext Edema] : lower extremity edema [Constipation] : constipation [Irregular Menses] : irregular menses [Pain/Numbness of Digits] : pain/numbness of digits [All other systems negative] : All other systems negative [FreeTextEntry6] : Her  complaining of her snoring at night has also sinus problems per patient never had a sleep study [FreeTextEntry7] : some times depends on eating  [FreeTextEntry8] : gets it monthly  [de-identified] : loss of body hair

## 2024-08-14 NOTE — REVIEW OF SYSTEMS
[Recent Weight Gain (___ Lbs)] : recent weight gain: [unfilled] lbs [Lower Ext Edema] : lower extremity edema [Constipation] : constipation [Irregular Menses] : irregular menses [Pain/Numbness of Digits] : pain/numbness of digits [All other systems negative] : All other systems negative [FreeTextEntry6] : Her  complaining of her snoring at night has also sinus problems per patient never had a sleep study [FreeTextEntry7] : some times depends on eating  [FreeTextEntry8] : gets it monthly  [de-identified] : loss of body hair

## 2024-08-22 ENCOUNTER — NON-APPOINTMENT (OUTPATIENT)
Age: 44
End: 2024-08-22

## 2024-08-22 DIAGNOSIS — K64.8 OTHER HEMORRHOIDS: ICD-10-CM

## 2024-08-22 DIAGNOSIS — Z87.19 PERSONAL HISTORY OF OTHER DISEASES OF THE DIGESTIVE SYSTEM: ICD-10-CM

## 2024-08-22 DIAGNOSIS — Z12.11 ENCOUNTER FOR SCREENING FOR MALIGNANT NEOPLASM OF COLON: ICD-10-CM

## 2024-10-01 ENCOUNTER — APPOINTMENT (OUTPATIENT)
Dept: GASTROENTEROLOGY | Facility: CLINIC | Age: 44
End: 2024-10-01
Payer: COMMERCIAL

## 2024-10-01 VITALS
DIASTOLIC BLOOD PRESSURE: 74 MMHG | BODY MASS INDEX: 44.18 KG/M2 | SYSTOLIC BLOOD PRESSURE: 118 MMHG | HEIGHT: 61 IN | WEIGHT: 234 LBS | HEART RATE: 96 BPM

## 2024-10-01 DIAGNOSIS — K21.00 GASTRO-ESOPHAGEAL REFLUX DISEASE WITH ESOPHAGITIS, WITHOUT BLEEDING: ICD-10-CM

## 2024-10-01 DIAGNOSIS — K29.70 GASTRITIS, UNSPECIFIED, W/OUT BLEEDING: ICD-10-CM

## 2024-10-01 DIAGNOSIS — R10.10 UPPER ABDOMINAL PAIN, UNSPECIFIED: ICD-10-CM

## 2024-10-01 DIAGNOSIS — Z12.11 ENCOUNTER FOR SCREENING FOR MALIGNANT NEOPLASM OF COLON: ICD-10-CM

## 2024-10-01 DIAGNOSIS — R19.8 OTHER SPECIFIED SYMPTOMS AND SIGNS INVOLVING THE DIGESTIVE SYSTEM AND ABDOMEN: ICD-10-CM

## 2024-10-01 DIAGNOSIS — K64.8 OTHER HEMORRHOIDS: ICD-10-CM

## 2024-10-01 DIAGNOSIS — R14.0 ABDOMINAL DISTENSION (GASEOUS): ICD-10-CM

## 2024-10-01 DIAGNOSIS — R10.30 LOWER ABDOMINAL PAIN, UNSPECIFIED: ICD-10-CM

## 2024-10-01 DIAGNOSIS — R68.81 EARLY SATIETY: ICD-10-CM

## 2024-10-01 DIAGNOSIS — Z87.19 PERSONAL HISTORY OF OTHER DISEASES OF THE DIGESTIVE SYSTEM: ICD-10-CM

## 2024-10-01 DIAGNOSIS — R10.33 PERIUMBILICAL PAIN: ICD-10-CM

## 2024-10-01 DIAGNOSIS — B96.81 GASTRITIS, UNSPECIFIED, W/OUT BLEEDING: ICD-10-CM

## 2024-10-01 DIAGNOSIS — K31.A0 GASTRIC INTESTINAL METAPLASIA, UNSPECIFIED: ICD-10-CM

## 2024-10-01 PROCEDURE — 99214 OFFICE O/P EST MOD 30 MIN: CPT

## 2024-10-01 NOTE — ASSESSMENT
[FreeTextEntry1] :  Abd pain--> Epigastric/ Sheyla-umbilical assoc w Early satiety, belch irregular bowel habits, bloating  DDX:  May be flare of Functional Bowel D/O 2/2 IVF                  R/O PUD                  Gastritis--> +H/O IM                  Gastric Dysmotility                  Celiac                  IBD                  Colonic Neoplasia--> + FH Colon Ca  P: anti-reflux, low fat, moderate fiber diet      No NSAIDS/ETOH      Pepcid AC qd      EGD      Colonoscopy      SBFT      NMGE scan          1. GERD:  well  -  controlled,  occas ht burn,  No dysphagia,  throat clear * No LPR,  Barretts w but + h/o  Esophagitis grade: A--  was found   Recommend: Pepcid AC prn  * Anti-reflux diet & life-style changes reviewed & re-emphasized.   * HOB elevation /  Bedge use emphasized * Weight reduction & regular exercise emphasized * No  need for pH Monitor,  Manometry, or  Esophagram  * No  need for ENT  eval/F/U,  * No  need for  Surgical  eval   * No F/U  EGD: --for Barretts screening / surveillance needed       2. Gastritis :  may be active w pain, early satiety *  + h/o  H.Pylori w +  IM,    Bile;    No NSAID  or  ETOH exposure-- was found Recommended and reviewed:  * Avoid NSAIDs / ETOH--have been shown to exacerbate and possible lead to cx's & GIBs *   2017 --s/p Rx w doxy, amox, pepto, omep x 10 days * No PPI  or  Carafate 1 gm QID is needed for EGD     3. Irritable Bowel Syndrome:  probably  active w irregularity, bloat H/O Post-Infectious/ h/o Lactose intolerance--> alternator but D>>C Recommend:  * Diet: moderate - Fiber,  Low Fat & Lactose free, Low FODMAPs--was  emphasized * Daily fluid intake was reviewed : 6  --  8 cups of decaffeinated fluid daily was emphasized * Regular aerobic exercise was emphasized * Probiotics  1  daily ? anti-spasmodics prn        4. NAFLD --> followed by Dr. Roth of Hepatology Recommend:  * Low Fat / Low Carbohydrate diet was reviewed and emphasized  * avoid  alcohol and other potential hepatoxins was emphasized * Formal wt. reduction program & regular exercise was emphasized * Tight glycemic control was  emphasized   * Anti-oxidants: Omega-3s  3 grams/D, Vit E 800 IU/D, Vit C 1000mg/D,  Vit D 2000 IU/D  was  emphasized On Metformin 1000 BID  5/2023 : AST=41/ALT=87; DAILY,ASMA,IGs, Ferritin--> wnl 6/13/23 Abd sono: Liver 28cm w diffuse fatty infiltration, spleen-wnl              Fibroscan:  5.29 kPa= F1: mild fibrosis 8/13/24 Labs: PavT7w=3.6, AST=19, ALT=31, ALP=83, ALB=4.3, TB=0.4, ZP=922,HDL=41, LDL=93 * Monitor LFTs, FBG, HgbA1c, Lipids :    most recent labs were reviewed and diet/ treatment changes were discussed * Abd Sonogram--   most recent imaging findings were  reviewed-->  no new Rx given Fibroscan--> will f/u w Dr. Roth       5. Colorectal  Neoplasia  Screening:  to be evaluated as part of irregular bowel habits    Utilizing teaching posters and anatomical models the following were discussed and emphasized with the patient in detail: * Discussed the pre-malignant potential of polyps * Discussed the importance of f/u surveillance / screening colonoscopy * moderate-High  Fiber Diet was  emphasized * Anti-oxidants and ASA/NSAID Therapy emphasized * Given age > 41 yo & + strong FH Colon Ca  * Recommend Colonoscopy  to  R/O  Colonic Neoplasia-- in 2024      Informed Consent: * The risks & Benefits of EGD  /  Colonoscopy were discussed w patient. * This included but was not limited to perforation, bleeding, sedation /med rxns, missed lesions possibly requiring surgery, blood transfusions, antibiotics & CPR/Intubation. * Pt. understands & agrees to the procedures. The following instructions in regards to the prep and medically essential ( cardiac, pulmonary, sz, psych, endocrine)  pre-op medication administration was reviewed and emphasized with the patient .  * Pt. advised to D/C  ASA/NSAIDs  7  Days PTP. * [ +++ ]  Dulcolax / Miralax / Mag. Citrate ,  [     ] Prepopik/ Clenpiq ,  [     ] Osmo Prep,  [    ] GoLytely,  prep. reviewed w Pt. * Hold  [  metformin          ] AM of procedure. * Hold  [           ] PM  before procedure. * Take  [           ] PM  before procedure. * Take  [           ] AM of procedure.

## 2024-10-01 NOTE — HISTORY OF PRESENT ILLNESS
[FreeTextEntry1] :       This HPI  reflects a summary and review of records : including previous and most recent  Labs, body imaging, consults and progress notes, operative and pathology reports, EKG reports, ED records, found in [facility], [facility],  [facility] and any additional records brought in by  the patient at the time of the visit.    PCP: Dr. Orozco   43 yo F w h/o Obesity, YRIS, PCOS, Pre-DM, HLD, Prolactinoma, Allergies Lactose Intolerance/IBS-post infectious, GERD, Gastritis w H.Pylori NAFLD--> Dr. Roth for AST=41/ALT=87; DAILY,ASMA,IGs, Ferritin--> wnl ? concomittant Covid/NSAIDs,  LFTS--subsequently resolved 6/13/23 Abd sono: Liver 28cm w diffuse fatty infiltration, spleen-wnl Fibroscan:  5.29 kPa= F1: mild fibrosis +FH Colon Ca: mat aunt/mat GM  12/2016 Init CC: epigastric/jodie-umbilical/lower pain, N/V/D after eating " [employer]" assoc N, belch, bloat, flatulance, gurgling Labs: celiac/IBD panels, cbc, cmet, lipase, b12/fa,iron--> wnl; ESR=26, CRP=1.4, P47=614 1/2017 EGD: mod CAG w HP, +IM, 1+ spasm; 4cm HH, Esophagitis A--, No Barretts, duod bx: villi wnl             s/p Rx w doxy, amox, pepto, omep x 10 days 1/17/17 Colonoscopy: random bx--> wnl 2/9/17 sbft--> wnl [place] to have post-infectous IBS--> Rx w Benefiber 2 tsp in am, probiotics, omep prn BMs: # 3-5, small/soft w strain or # 6-7, multiple w pre-BM cramping  10/1/24    Today:   no c/o , CP, SOB/ YOO, Cough, Wheeze, Palpitations, edema 8/13/24 Labs: OxbQ2o=2.6, AST=19, ALT=31, ALP=83, ALB=4.3, TB=0.4, OM=775,HDL=41, LDL=93  10/1/24   Today:  recently over last yr started IVF w/o success, has been stressful and expensive                            Has noted worsening of baseline early satiety/ bloating                            Frustrated w wt gain and body image                            Has noted episodes of abd pain-mostly jodie-umbilical and epigastric--> several x/month                            This leads to abd bloating/ " hardness"--can last several hrs or can be sht lived                            Also notes BMs become irregular, during episode--> usu no BM                            Other wise BMs: # 3-4, 2-4x/D, but can loose/watery, no blood or mucous                            No recent ABX, but occas NSAID                            + Belch, flatulance, occas ht burn, no dysphagia  * Nausea--> no * Vomit--> no * Early satiety--> yes * Belching--> yes * Hiccups--> no * Regurgitation--> no * Acid Taste / Water Brash--> no * Ht burn--> occas  * Dysphagia--> no * Throat Clearing--> no * Hoarseness--> no * Post-Nasal Drip--> no * Congestion--> no * Globus--> no * Cough--> no * Wheeze / PC-> -no * Strain on Defecation--> no * Incompl Evac--> no * Flatulence--> yes * Gurgling--> yes * Melena--> no * BPBPR-> -no * Anorexia--> no * Wt. Loss--> no

## 2024-10-01 NOTE — HISTORY OF PRESENT ILLNESS
[FreeTextEntry1] :       This HPI  reflects a summary and review of records : including previous and most recent  Labs, body imaging, consults and progress notes, operative and pathology reports, EKG reports, ED records, found in [email or web address], [employer],  Telepo and any additional records brought in by  the patient at the time of the visit.    PCP: Dr. Orozco   45 yo F w h/o Obesity, YRIS, PCOS, Pre-DM, HLD, Prolactinoma, Allergies Lactose Intolerance/IBS-post infectious, GERD, Gastritis w H.Pylori NAFLD--> Dr. Roth for AST=41/ALT=87; DAILY,ASMA,IGs, Ferritin--> wnl ? concomittant Covid/NSAIDs,  LFTS--subsequently resolved 6/13/23 Abd sono: Liver 28cm w diffuse fatty infiltration, spleen-wnl Fibroscan:  5.29 kPa= F1: mild fibrosis +FH Colon Ca: mat aunt/mat GM  12/2016 Init CC: epigastric/jodie-umbilical/lower pain, N/V/D after eating " old [employer]" assoc N, belch, bloat, flatulance, gurgling Labs: celiac/IBD panels, cbc, cmet, lipase, b12/fa,iron--> wnl; ESR=26, CRP=1.4, C45=995 1/2017 EGD: mod CAG w HP, +IM, 1+ spasm; 4cm HH, Esophagitis A--, No Barretts, duod bx: villi wnl             s/p Rx w doxy, amox, pepto, omep x 10 days 1/17/17 Colonoscopy: random bx--> wnl 2/9/17 sbft--> wnl [place] to have post-infectous IBS--> Rx w Benefiber 2 tsp in am, probiotics, omep prn BMs: # 3-5, small/soft w strain or # 6-7, multiple w pre-BM cramping  10/1/24    Today:   no c/o , CP, SOB/ YOO, Cough, Wheeze, Palpitations, edema 8/13/24 Labs: VfiC2e=3.6, AST=19, ALT=31, ALP=83, ALB=4.3, TB=0.4, GV=413,HDL=41, LDL=93  10/1/24   Today:  recently over last yr started IVF w/o success, has been stressful and expensive                            Has noted worsening of baseline early satiety/ bloating                            Frustrated w wt gain and body image                            Has noted episodes of abd pain-mostly jodie-umbilical and epigastric--> several x/month                            This leads to abd bloating/ " hardness"--can last several hrs or can be sht lived                            Also notes BMs become irregular, during episode--> usu no BM                            Other wise BMs: # 3-4, 2-4x/D, but can loose/watery, no blood or mucous                            No recent ABX, but occas NSAID                            + Belch, flatulance, occas ht burn, no dysphagia  * Nausea--> no * Vomit--> no * Early satiety--> yes * Belching--> yes * Hiccups--> no * Regurgitation--> no * Acid Taste / Water Brash--> no * Ht burn--> occas  * Dysphagia--> no * Throat Clearing--> no * Hoarseness--> no * Post-Nasal Drip--> no * Congestion--> no * Globus--> no * Cough--> no * Wheeze / PC-> -no * Strain on Defecation--> no * Incompl Evac--> no * Flatulence--> yes * Gurgling--> yes * Melena--> no * BPBPR-> -no * Anorexia--> no * Wt. Loss--> no

## 2024-10-01 NOTE — ASSESSMENT
[FreeTextEntry1] :  Abd pain--> Epigastric/ [place]-umbilical assoc w Early satiety, belch irregular bowel habits, bloating  DDX:  May be flare of Functional Bowel D/O 2/2 IVF                  R/O PUD                  Gastritis--> +H/O IM                  Gastric Dysmotility                  Celiac                  IBD                  Colonic Neoplasia--> + FH Colon Ca  P: anti-reflux, low fat, moderate fiber diet      No NSAIDS/ETOH      Pepcid AC qd      EGD      Colonoscopy      SBFT      NMGE scan          1. GERD:  well  -  controlled,  occas ht burn,  No dysphagia,  throat clear * No LPR,  Barretts w but + h/o  Esophagitis grade: A--  was found   Recommend: Pepcid AC prn  * Anti-reflux diet & life-style changes reviewed & re-emphasized.   * HOB elevation /  Bedge use emphasized * Weight reduction & regular exercise emphasized * No  need for pH Monitor,  Manometry, or  Esophagram  * No  need for ENT  eval/F/U,  * No  need for  Surgical  eval   * No F/U  EGD: --for Barretts screening / surveillance needed       2. Gastritis :  may be active w pain, early satiety *  + h/o  H.Pylori w +  IM,    Bile;    No NSAID  or  ETOH exposure-- was found Recommended and reviewed:  * Avoid NSAIDs / ETOH--have been shown to exacerbate and possible lead to cx's & GIBs *   2017 --s/p Rx w doxy, amox, pepto, omep x 10 days * No PPI  or  Carafate 1 gm QID is needed for EGD     3. Irritable Bowel Syndrome:  probably  active w irregularity, bloat H/O Post-Infectious/ h/o Lactose intolerance--> alternator but D>>C Recommend:  * Diet: moderate - Fiber,  Low Fat & Lactose free, Low FODMAPs--was  emphasized * Daily fluid intake was reviewed : 6  --  8 cups of decaffeinated fluid daily was emphasized * Regular aerobic exercise was emphasized * Probiotics  1  daily ? anti-spasmodics prn        4. NAFLD --> followed by Dr. Roth of Hepatology Recommend:  * Low Fat / Low Carbohydrate diet was reviewed and emphasized  * avoid  alcohol and other potential hepatoxins was emphasized * Formal wt. reduction program & regular exercise was emphasized * Tight glycemic control was  emphasized   * Anti-oxidants: Omega-3s  3 grams/D, Vit E 800 IU/D, Vit C 1000mg/D,  Vit D 2000 IU/D  was  emphasized On Metformin 1000 BID  5/2023 : AST=41/ALT=87; DAILY,ASMA,IGs, Ferritin--> wnl 6/13/23 Abd sono: Liver 28cm w diffuse fatty infiltration, spleen-wnl              Fibroscan:  5.29 kPa= F1: mild fibrosis 8/13/24 Labs: XpsT1s=9.6, AST=19, ALT=31, ALP=83, ALB=4.3, TB=0.4, QK=935,HDL=41, LDL=93 * Monitor LFTs, FBG, HgbA1c, Lipids :    most recent labs were reviewed and diet/ treatment changes were discussed * Abd Sonogram--   most recent imaging findings were  reviewed-->  no new Rx given Fibroscan--> will f/u w Dr. Roth       5. Colorectal  Neoplasia  Screening:  to be evaluated as part of irregular bowel habits    Utilizing teaching posters and anatomical models the following were discussed and emphasized with the patient in detail: * Discussed the pre-malignant potential of polyps * Discussed the importance of f/u surveillance / screening colonoscopy * moderate-High  Fiber Diet was  emphasized * Anti-oxidants and ASA/NSAID Therapy emphasized * Given age > 41 yo & + strong FH Colon Ca  * Recommend Colonoscopy  to  R/O  Colonic Neoplasia-- in 2024      Informed Consent: * The risks & Benefits of EGD  /  Colonoscopy were discussed w patient. * This included but was not limited to perforation, bleeding, sedation /med rxns, missed lesions possibly requiring surgery, blood transfusions, antibiotics & CPR/Intubation. * Pt. understands & agrees to the procedures. The following instructions in regards to the prep and medically essential ( cardiac, pulmonary, sz, psych, endocrine)  pre-op medication administration was reviewed and emphasized with the patient .  * Pt. advised to D/C  ASA/NSAIDs  7  Days PTP. * [ +++ ]  Dulcolax / Miralax / Mag. Citrate ,  [     ] Prepopik/ Clenpiq ,  [     ] Osmo Prep,  [    ] GoLytely,  prep. reviewed w Pt. * Hold  [  metformin          ] AM of procedure. * Hold  [           ] PM  before procedure. * Take  [           ] PM  before procedure. * Take  [           ] AM of procedure.

## 2024-10-21 ENCOUNTER — RESULT REVIEW (OUTPATIENT)
Age: 44
End: 2024-10-21

## 2024-10-25 ENCOUNTER — RESULT REVIEW (OUTPATIENT)
Age: 44
End: 2024-10-25

## 2024-11-09 ENCOUNTER — LABORATORY RESULT (OUTPATIENT)
Age: 44
End: 2024-11-09

## 2024-11-12 ENCOUNTER — APPOINTMENT (OUTPATIENT)
Dept: ENDOCRINOLOGY | Facility: CLINIC | Age: 44
End: 2024-11-12

## 2024-11-12 VITALS
SYSTOLIC BLOOD PRESSURE: 128 MMHG | HEART RATE: 104 BPM | OXYGEN SATURATION: 99 % | HEIGHT: 61 IN | DIASTOLIC BLOOD PRESSURE: 80 MMHG | BODY MASS INDEX: 44.93 KG/M2 | WEIGHT: 238 LBS

## 2024-11-12 DIAGNOSIS — E78.5 HYPERLIPIDEMIA, UNSPECIFIED: ICD-10-CM

## 2024-11-12 DIAGNOSIS — E66.01 MORBID (SEVERE) OBESITY DUE TO EXCESS CALORIES: ICD-10-CM

## 2024-11-12 DIAGNOSIS — E28.2 POLYCYSTIC OVARIAN SYNDROME: ICD-10-CM

## 2024-11-12 DIAGNOSIS — Z86.018 PERSONAL HISTORY OF OTHER BENIGN NEOPLASM: ICD-10-CM

## 2024-11-12 DIAGNOSIS — R74.8 ABNORMAL LEVELS OF OTHER SERUM ENZYMES: ICD-10-CM

## 2024-11-12 DIAGNOSIS — R73.09 OTHER ABNORMAL GLUCOSE: ICD-10-CM

## 2024-11-12 DIAGNOSIS — N92.6 IRREGULAR MENSTRUATION, UNSPECIFIED: ICD-10-CM

## 2024-11-12 DIAGNOSIS — E01.0 IODINE-DEFICIENCY RELATED DIFFUSE (ENDEMIC) GOITER: ICD-10-CM

## 2024-11-12 DIAGNOSIS — G47.33 OBSTRUCTIVE SLEEP APNEA (ADULT) (PEDIATRIC): ICD-10-CM

## 2024-11-12 DIAGNOSIS — R79.89 OTHER SPECIFIED ABNORMAL FINDINGS OF BLOOD CHEMISTRY: ICD-10-CM

## 2024-11-12 PROCEDURE — 99215 OFFICE O/P EST HI 40 MIN: CPT

## 2024-11-12 PROCEDURE — G2211 COMPLEX E/M VISIT ADD ON: CPT | Mod: NC

## 2024-11-12 PROCEDURE — G0447 BEHAVIOR COUNSEL OBESITY 15M: CPT | Mod: 59

## 2024-12-03 ENCOUNTER — APPOINTMENT (OUTPATIENT)
Dept: BARIATRICS/WEIGHT MGMT | Facility: CLINIC | Age: 44
End: 2024-12-03
Payer: COMMERCIAL

## 2024-12-03 VITALS
HEART RATE: 94 BPM | BODY MASS INDEX: 44.44 KG/M2 | OXYGEN SATURATION: 96 % | HEIGHT: 61 IN | DIASTOLIC BLOOD PRESSURE: 86 MMHG | WEIGHT: 235.38 LBS | SYSTOLIC BLOOD PRESSURE: 130 MMHG

## 2024-12-03 DIAGNOSIS — E78.5 HYPERLIPIDEMIA, UNSPECIFIED: ICD-10-CM

## 2024-12-03 DIAGNOSIS — R73.09 OTHER ABNORMAL GLUCOSE: ICD-10-CM

## 2024-12-03 DIAGNOSIS — E66.01 MORBID (SEVERE) OBESITY DUE TO EXCESS CALORIES: ICD-10-CM

## 2024-12-03 DIAGNOSIS — Z87.19 PERSONAL HISTORY OF OTHER DISEASES OF THE DIGESTIVE SYSTEM: ICD-10-CM

## 2024-12-03 PROCEDURE — G2211 COMPLEX E/M VISIT ADD ON: CPT | Mod: NC

## 2024-12-03 PROCEDURE — 99205 OFFICE O/P NEW HI 60 MIN: CPT

## 2024-12-18 ENCOUNTER — APPOINTMENT (OUTPATIENT)
Dept: GASTROENTEROLOGY | Facility: HOSPITAL | Age: 44
End: 2024-12-18

## 2025-01-14 ENCOUNTER — APPOINTMENT (OUTPATIENT)
Dept: GASTROENTEROLOGY | Facility: CLINIC | Age: 45
End: 2025-01-14

## 2025-01-14 DIAGNOSIS — R14.0 ABDOMINAL DISTENSION (GASEOUS): ICD-10-CM

## 2025-01-14 PROCEDURE — 99214 OFFICE O/P EST MOD 30 MIN: CPT

## 2025-01-15 DIAGNOSIS — Z12.11 ENCOUNTER FOR SCREENING FOR MALIGNANT NEOPLASM OF COLON: ICD-10-CM

## 2025-01-15 RX ORDER — SODIUM PICOSULFATE, MAGNESIUM OXIDE, AND ANHYDROUS CITRIC ACID 12; 3.5; 1 G/175ML; G/175ML; MG/175ML
10-3.5-12 MG-GM LIQUID ORAL
Qty: 2 | Refills: 0 | Status: ACTIVE | COMMUNITY
Start: 2025-01-15 | End: 1900-01-01

## 2025-02-11 ENCOUNTER — APPOINTMENT (OUTPATIENT)
Dept: ENDOCRINOLOGY | Facility: CLINIC | Age: 45
End: 2025-02-11

## 2025-02-12 ENCOUNTER — TRANSCRIPTION ENCOUNTER (OUTPATIENT)
Age: 45
End: 2025-02-12

## 2025-02-12 ENCOUNTER — RESULT REVIEW (OUTPATIENT)
Age: 45
End: 2025-02-12

## 2025-02-12 ENCOUNTER — APPOINTMENT (OUTPATIENT)
Dept: GASTROENTEROLOGY | Facility: HOSPITAL | Age: 45
End: 2025-02-12

## 2025-03-01 ENCOUNTER — NON-APPOINTMENT (OUTPATIENT)
Age: 45
End: 2025-03-01

## 2025-03-01 DIAGNOSIS — K31.A0 GASTRIC INTESTINAL METAPLASIA, UNSPECIFIED: ICD-10-CM

## 2025-03-01 DIAGNOSIS — R68.81 EARLY SATIETY: ICD-10-CM

## 2025-03-01 DIAGNOSIS — K64.8 OTHER HEMORRHOIDS: ICD-10-CM

## 2025-03-01 DIAGNOSIS — R10.33 PERIUMBILICAL PAIN: ICD-10-CM

## 2025-03-01 DIAGNOSIS — B96.81 GASTRITIS, UNSPECIFIED, W/OUT BLEEDING: ICD-10-CM

## 2025-03-01 DIAGNOSIS — K29.70 GASTRITIS, UNSPECIFIED, W/OUT BLEEDING: ICD-10-CM

## 2025-03-01 DIAGNOSIS — Z87.39 PERSONAL HISTORY OF OTHER DISEASES OF THE MUSCULOSKELETAL SYSTEM AND CONNECTIVE TISSUE: ICD-10-CM

## 2025-03-01 DIAGNOSIS — R10.30 LOWER ABDOMINAL PAIN, UNSPECIFIED: ICD-10-CM

## 2025-03-01 DIAGNOSIS — R14.0 ABDOMINAL DISTENSION (GASEOUS): ICD-10-CM

## 2025-03-01 DIAGNOSIS — Z87.19 PERSONAL HISTORY OF OTHER DISEASES OF THE DIGESTIVE SYSTEM: ICD-10-CM

## 2025-03-01 DIAGNOSIS — R10.10 UPPER ABDOMINAL PAIN, UNSPECIFIED: ICD-10-CM

## 2025-03-05 ENCOUNTER — APPOINTMENT (OUTPATIENT)
Dept: CARDIOLOGY | Facility: CLINIC | Age: 45
End: 2025-03-05
Payer: COMMERCIAL

## 2025-03-05 ENCOUNTER — NON-APPOINTMENT (OUTPATIENT)
Age: 45
End: 2025-03-05

## 2025-03-05 VITALS
WEIGHT: 235 LBS | SYSTOLIC BLOOD PRESSURE: 130 MMHG | BODY MASS INDEX: 44.4 KG/M2 | DIASTOLIC BLOOD PRESSURE: 80 MMHG | HEART RATE: 92 BPM | OXYGEN SATURATION: 98 %

## 2025-03-05 DIAGNOSIS — E78.5 HYPERLIPIDEMIA, UNSPECIFIED: ICD-10-CM

## 2025-03-05 DIAGNOSIS — E28.2 POLYCYSTIC OVARIAN SYNDROME: ICD-10-CM

## 2025-03-05 DIAGNOSIS — G47.33 OBSTRUCTIVE SLEEP APNEA (ADULT) (PEDIATRIC): ICD-10-CM

## 2025-03-05 DIAGNOSIS — R06.09 OTHER FORMS OF DYSPNEA: ICD-10-CM

## 2025-03-05 DIAGNOSIS — N97.9 FEMALE INFERTILITY, UNSPECIFIED: ICD-10-CM

## 2025-03-05 DIAGNOSIS — R73.09 OTHER ABNORMAL GLUCOSE: ICD-10-CM

## 2025-03-05 DIAGNOSIS — E66.01 MORBID (SEVERE) OBESITY DUE TO EXCESS CALORIES: ICD-10-CM

## 2025-03-05 PROCEDURE — 99215 OFFICE O/P EST HI 40 MIN: CPT | Mod: 25

## 2025-03-05 PROCEDURE — 93000 ELECTROCARDIOGRAM COMPLETE: CPT

## 2025-03-05 RX ORDER — ASPIRIN ENTERIC COATED TABLETS 81 MG 81 MG/1
81 TABLET, DELAYED RELEASE ORAL DAILY
Qty: 30 | Refills: 0 | Status: ACTIVE | COMMUNITY
Start: 2025-03-05

## 2025-04-03 RX ORDER — OMEPRAZOLE 20 MG/1
20 CAPSULE, DELAYED RELEASE ORAL
Qty: 60 | Refills: 3 | Status: ACTIVE | COMMUNITY
Start: 2025-04-03 | End: 1900-01-01

## 2025-04-29 ENCOUNTER — APPOINTMENT (OUTPATIENT)
Dept: CARDIOLOGY | Facility: CLINIC | Age: 45
End: 2025-04-29
Payer: COMMERCIAL

## 2025-04-29 PROCEDURE — 93306 TTE W/DOPPLER COMPLETE: CPT

## 2025-04-30 ENCOUNTER — TRANSCRIPTION ENCOUNTER (OUTPATIENT)
Age: 45
End: 2025-04-30

## 2025-05-03 RX ORDER — OMEPRAZOLE 40 MG/1
40 CAPSULE, DELAYED RELEASE ORAL
Qty: 90 | Refills: 3 | Status: ACTIVE | COMMUNITY
Start: 2025-05-03 | End: 1900-01-01

## 2025-05-13 ENCOUNTER — APPOINTMENT (OUTPATIENT)
Dept: ENDOCRINOLOGY | Facility: CLINIC | Age: 45
End: 2025-05-13
Payer: COMMERCIAL

## 2025-05-13 VITALS
BODY MASS INDEX: 43.43 KG/M2 | OXYGEN SATURATION: 99 % | DIASTOLIC BLOOD PRESSURE: 82 MMHG | HEART RATE: 85 BPM | HEIGHT: 62 IN | WEIGHT: 236 LBS | SYSTOLIC BLOOD PRESSURE: 130 MMHG

## 2025-05-13 DIAGNOSIS — R74.8 ABNORMAL LEVELS OF OTHER SERUM ENZYMES: ICD-10-CM

## 2025-05-13 DIAGNOSIS — N92.6 IRREGULAR MENSTRUATION, UNSPECIFIED: ICD-10-CM

## 2025-05-13 DIAGNOSIS — Z86.018 PERSONAL HISTORY OF OTHER BENIGN NEOPLASM: ICD-10-CM

## 2025-05-13 DIAGNOSIS — R79.89 OTHER SPECIFIED ABNORMAL FINDINGS OF BLOOD CHEMISTRY: ICD-10-CM

## 2025-05-13 DIAGNOSIS — E01.0 IODINE-DEFICIENCY RELATED DIFFUSE (ENDEMIC) GOITER: ICD-10-CM

## 2025-05-13 DIAGNOSIS — E78.5 HYPERLIPIDEMIA, UNSPECIFIED: ICD-10-CM

## 2025-05-13 DIAGNOSIS — E28.2 POLYCYSTIC OVARIAN SYNDROME: ICD-10-CM

## 2025-05-13 DIAGNOSIS — E66.01 MORBID (SEVERE) OBESITY DUE TO EXCESS CALORIES: ICD-10-CM

## 2025-05-13 DIAGNOSIS — R73.09 OTHER ABNORMAL GLUCOSE: ICD-10-CM

## 2025-05-13 DIAGNOSIS — G47.33 OBSTRUCTIVE SLEEP APNEA (ADULT) (PEDIATRIC): ICD-10-CM

## 2025-05-13 PROCEDURE — 99215 OFFICE O/P EST HI 40 MIN: CPT

## 2025-05-13 PROCEDURE — G0447 BEHAVIOR COUNSEL OBESITY 15M: CPT | Mod: 59

## 2025-05-13 PROCEDURE — G2211 COMPLEX E/M VISIT ADD ON: CPT | Mod: NC

## 2025-05-13 RX ORDER — TIRZEPATIDE 5 MG/.5ML
5 INJECTION, SOLUTION SUBCUTANEOUS
Qty: 1 | Refills: 4 | Status: ACTIVE | COMMUNITY
Start: 2025-05-13 | End: 1900-01-01

## 2025-07-21 ENCOUNTER — NON-APPOINTMENT (OUTPATIENT)
Age: 45
End: 2025-07-21